# Patient Record
Sex: MALE | Race: WHITE | NOT HISPANIC OR LATINO | Employment: OTHER | URBAN - METROPOLITAN AREA
[De-identification: names, ages, dates, MRNs, and addresses within clinical notes are randomized per-mention and may not be internally consistent; named-entity substitution may affect disease eponyms.]

---

## 2023-12-13 ENCOUNTER — HOSPITAL ENCOUNTER (OUTPATIENT)
Facility: CLINIC | Age: 68
Setting detail: OBSERVATION
Discharge: HOME | End: 2023-12-15
Attending: EMERGENCY MEDICINE | Admitting: PEDIATRICS
Payer: COMMERCIAL

## 2023-12-13 DIAGNOSIS — N17.9 ACUTE KIDNEY INJURY (H): ICD-10-CM

## 2023-12-13 DIAGNOSIS — K21.9 GASTROESOPHAGEAL REFLUX DISEASE WITHOUT ESOPHAGITIS: Primary | ICD-10-CM

## 2023-12-13 DIAGNOSIS — J96.01 ACUTE RESPIRATORY FAILURE WITH HYPOXIA (H): ICD-10-CM

## 2023-12-13 DIAGNOSIS — D50.9 IRON DEFICIENCY ANEMIA, UNSPECIFIED IRON DEFICIENCY ANEMIA TYPE: ICD-10-CM

## 2023-12-13 DIAGNOSIS — D64.9 ANEMIA, UNSPECIFIED TYPE: ICD-10-CM

## 2023-12-13 DIAGNOSIS — R55 SYNCOPE, UNSPECIFIED SYNCOPE TYPE: ICD-10-CM

## 2023-12-13 PROCEDURE — 99285 EMERGENCY DEPT VISIT HI MDM: CPT | Mod: 25 | Performed by: EMERGENCY MEDICINE

## 2023-12-13 PROCEDURE — 93010 ELECTROCARDIOGRAM REPORT: CPT | Performed by: EMERGENCY MEDICINE

## 2023-12-13 PROCEDURE — 96360 HYDRATION IV INFUSION INIT: CPT

## 2023-12-13 PROCEDURE — 93005 ELECTROCARDIOGRAM TRACING: CPT

## 2023-12-13 PROCEDURE — 99291 CRITICAL CARE FIRST HOUR: CPT | Mod: 25

## 2023-12-13 PROCEDURE — 96361 HYDRATE IV INFUSION ADD-ON: CPT

## 2023-12-14 ENCOUNTER — APPOINTMENT (OUTPATIENT)
Dept: GENERAL RADIOLOGY | Facility: CLINIC | Age: 68
End: 2023-12-14
Attending: EMERGENCY MEDICINE
Payer: COMMERCIAL

## 2023-12-14 ENCOUNTER — APPOINTMENT (OUTPATIENT)
Dept: ULTRASOUND IMAGING | Facility: CLINIC | Age: 68
End: 2023-12-14
Attending: HOSPITALIST
Payer: COMMERCIAL

## 2023-12-14 ENCOUNTER — APPOINTMENT (OUTPATIENT)
Dept: CARDIOLOGY | Facility: CLINIC | Age: 68
End: 2023-12-14
Attending: HOSPITALIST
Payer: COMMERCIAL

## 2023-12-14 ENCOUNTER — APPOINTMENT (OUTPATIENT)
Dept: CT IMAGING | Facility: CLINIC | Age: 68
End: 2023-12-14
Attending: EMERGENCY MEDICINE
Payer: COMMERCIAL

## 2023-12-14 PROBLEM — N17.9 ACUTE KIDNEY INJURY (H): Status: ACTIVE | Noted: 2023-12-14

## 2023-12-14 PROBLEM — R09.02 HYPOXIA: Status: ACTIVE | Noted: 2023-12-14

## 2023-12-14 PROBLEM — I10 HTN (HYPERTENSION): Status: ACTIVE | Noted: 2023-12-14

## 2023-12-14 PROBLEM — D50.9 IRON DEFICIENCY ANEMIA: Status: ACTIVE | Noted: 2023-12-14

## 2023-12-14 PROBLEM — J44.9 COPD (CHRONIC OBSTRUCTIVE PULMONARY DISEASE) (H): Status: ACTIVE | Noted: 2023-12-14

## 2023-12-14 PROBLEM — R63.8 INADEQUATE ORAL INTAKE: Status: ACTIVE | Noted: 2023-12-14

## 2023-12-14 PROBLEM — R55 SYNCOPE, UNSPECIFIED SYNCOPE TYPE: Status: ACTIVE | Noted: 2023-12-14

## 2023-12-14 PROBLEM — D64.9 ANEMIA, UNSPECIFIED TYPE: Status: ACTIVE | Noted: 2023-12-14

## 2023-12-14 PROBLEM — I95.9 HYPOTENSION, UNSPECIFIED HYPOTENSION TYPE: Status: ACTIVE | Noted: 2023-12-14

## 2023-12-14 PROBLEM — K21.9 GASTROESOPHAGEAL REFLUX DISEASE WITHOUT ESOPHAGITIS: Status: ACTIVE | Noted: 2023-12-14

## 2023-12-14 PROBLEM — J96.01 ACUTE RESPIRATORY FAILURE WITH HYPOXIA (H): Status: ACTIVE | Noted: 2023-12-14

## 2023-12-14 LAB
ALBUMIN SERPL BCG-MCNC: 3.3 G/DL (ref 3.5–5.2)
ALBUMIN UR-MCNC: 30 MG/DL
ALP SERPL-CCNC: 63 U/L (ref 40–150)
ALT SERPL W P-5'-P-CCNC: 10 U/L (ref 0–70)
AMPHETAMINES UR QL SCN: ABNORMAL
ANION GAP SERPL CALCULATED.3IONS-SCNC: 10 MMOL/L (ref 7–15)
ANION GAP SERPL CALCULATED.3IONS-SCNC: 8 MMOL/L (ref 7–15)
APPEARANCE UR: ABNORMAL
AST SERPL W P-5'-P-CCNC: 13 U/L (ref 0–45)
BARBITURATES UR QL SCN: ABNORMAL
BASE EXCESS BLDV CALC-SCNC: -0.6 MMOL/L (ref -7.7–1.9)
BASE EXCESS BLDV CALC-SCNC: -1.9 MMOL/L (ref -7.7–1.9)
BASOPHILS # BLD AUTO: 0.1 10E3/UL (ref 0–0.2)
BASOPHILS NFR BLD AUTO: 1 %
BENZODIAZ UR QL SCN: ABNORMAL
BILIRUB SERPL-MCNC: 0.2 MG/DL
BILIRUB UR QL STRIP: NEGATIVE
BUN SERPL-MCNC: 28.5 MG/DL (ref 8–23)
BUN SERPL-MCNC: 33.4 MG/DL (ref 8–23)
BZE UR QL SCN: ABNORMAL
CALCIUM SERPL-MCNC: 7.7 MG/DL (ref 8.8–10.2)
CALCIUM SERPL-MCNC: 8 MG/DL (ref 8.8–10.2)
CANNABINOIDS UR QL SCN: ABNORMAL
CHLORIDE SERPL-SCNC: 107 MMOL/L (ref 98–107)
CHLORIDE SERPL-SCNC: 111 MMOL/L (ref 98–107)
CHOLEST SERPL-MCNC: 178 MG/DL
COLOR UR AUTO: YELLOW
CREAT SERPL-MCNC: 1.58 MG/DL (ref 0.67–1.17)
CREAT SERPL-MCNC: 2.49 MG/DL (ref 0.67–1.17)
DEPRECATED HCO3 PLAS-SCNC: 23 MMOL/L (ref 22–29)
DEPRECATED HCO3 PLAS-SCNC: 23 MMOL/L (ref 22–29)
EGFRCR SERPLBLD CKD-EPI 2021: 28 ML/MIN/1.73M2
EGFRCR SERPLBLD CKD-EPI 2021: 48 ML/MIN/1.73M2
EOSINOPHIL # BLD AUTO: 0.2 10E3/UL (ref 0–0.7)
EOSINOPHIL NFR BLD AUTO: 2 %
ERYTHROCYTE [DISTWIDTH] IN BLOOD BY AUTOMATED COUNT: 15.6 % (ref 10–15)
ETHANOL SERPL-MCNC: <0.01 G/DL
FENTANYL UR QL: ABNORMAL
GLUCOSE BLDC GLUCOMTR-MCNC: 104 MG/DL (ref 70–99)
GLUCOSE BLDC GLUCOMTR-MCNC: 105 MG/DL (ref 70–99)
GLUCOSE BLDC GLUCOMTR-MCNC: 98 MG/DL (ref 70–99)
GLUCOSE SERPL-MCNC: 100 MG/DL (ref 70–99)
GLUCOSE SERPL-MCNC: 112 MG/DL (ref 70–99)
GLUCOSE UR STRIP-MCNC: NEGATIVE MG/DL
HBA1C MFR BLD: 5.7 %
HCO3 BLDV-SCNC: 25 MMOL/L (ref 21–28)
HCO3 BLDV-SCNC: 25 MMOL/L (ref 21–28)
HCT VFR BLD AUTO: 27.3 % (ref 40–53)
HDLC SERPL-MCNC: 26 MG/DL
HGB BLD-MCNC: 8.4 G/DL (ref 13.3–17.7)
HGB BLD-MCNC: 8.7 G/DL (ref 13.3–17.7)
HGB UR QL STRIP: NEGATIVE
HOLD SPECIMEN: NORMAL
HYALINE CASTS: 78 /LPF
IMM GRANULOCYTES # BLD: 0.1 10E3/UL
IMM GRANULOCYTES NFR BLD: 1 %
KETONES UR STRIP-MCNC: NEGATIVE MG/DL
LACTATE SERPL-SCNC: 1.5 MMOL/L (ref 0.7–2)
LDLC SERPL CALC-MCNC: 129 MG/DL
LEUKOCYTE ESTERASE UR QL STRIP: NEGATIVE
LVEF ECHO: NORMAL
LYMPHOCYTES # BLD AUTO: 1.8 10E3/UL (ref 0.8–5.3)
LYMPHOCYTES NFR BLD AUTO: 22 %
MAGNESIUM SERPL-MCNC: 2.1 MG/DL (ref 1.7–2.3)
MCH RBC QN AUTO: 27.5 PG (ref 26.5–33)
MCHC RBC AUTO-ENTMCNC: 31.9 G/DL (ref 31.5–36.5)
MCV RBC AUTO: 86 FL (ref 78–100)
MONOCYTES # BLD AUTO: 0.9 10E3/UL (ref 0–1.3)
MONOCYTES NFR BLD AUTO: 11 %
MUCOUS THREADS #/AREA URNS LPF: PRESENT /LPF
NEUTROPHILS # BLD AUTO: 5.2 10E3/UL (ref 1.6–8.3)
NEUTROPHILS NFR BLD AUTO: 63 %
NITRATE UR QL: NEGATIVE
NONHDLC SERPL-MCNC: 152 MG/DL
NRBC # BLD AUTO: 0 10E3/UL
NRBC BLD AUTO-RTO: 0 /100
O2/TOTAL GAS SETTING VFR VENT: 21 %
O2/TOTAL GAS SETTING VFR VENT: 40 %
OPIATES UR QL SCN: ABNORMAL
PCO2 BLDV: 43 MM HG (ref 40–50)
PCO2 BLDV: 52 MM HG (ref 40–50)
PCP QUAL URINE (ROCHE): ABNORMAL
PH BLDV: 7.29 [PH] (ref 7.32–7.43)
PH BLDV: 7.37 [PH] (ref 7.32–7.43)
PH UR STRIP: 5 [PH] (ref 5–7)
PLATELET # BLD AUTO: 227 10E3/UL (ref 150–450)
PO2 BLDV: 32 MM HG (ref 25–47)
PO2 BLDV: 67 MM HG (ref 25–47)
POTASSIUM SERPL-SCNC: 3.5 MMOL/L (ref 3.4–5.3)
POTASSIUM SERPL-SCNC: 4 MMOL/L (ref 3.4–5.3)
PROT SERPL-MCNC: 5.9 G/DL (ref 6.4–8.3)
RBC # BLD AUTO: 3.16 10E6/UL (ref 4.4–5.9)
RBC URINE: 0 /HPF
SODIUM SERPL-SCNC: 140 MMOL/L (ref 135–145)
SODIUM SERPL-SCNC: 142 MMOL/L (ref 135–145)
SP GR UR STRIP: 1.02 (ref 1–1.03)
SQUAMOUS EPITHELIAL: 1 /HPF
TRIGL SERPL-MCNC: 113 MG/DL
TROPONIN T SERPL HS-MCNC: 14 NG/L
TROPONIN T SERPL HS-MCNC: 18 NG/L
TROPONIN T SERPL HS-MCNC: 21 NG/L
UROBILINOGEN UR STRIP-MCNC: NORMAL MG/DL
WBC # BLD AUTO: 8.2 10E3/UL (ref 4–11)
WBC URINE: 1 /HPF

## 2023-12-14 PROCEDURE — G0378 HOSPITAL OBSERVATION PER HR: HCPCS

## 2023-12-14 PROCEDURE — 93306 TTE W/DOPPLER COMPLETE: CPT

## 2023-12-14 PROCEDURE — 96361 HYDRATE IV INFUSION ADD-ON: CPT

## 2023-12-14 PROCEDURE — 93880 EXTRACRANIAL BILAT STUDY: CPT

## 2023-12-14 PROCEDURE — 96372 THER/PROPH/DIAG INJ SC/IM: CPT | Performed by: HOSPITALIST

## 2023-12-14 PROCEDURE — 84484 ASSAY OF TROPONIN QUANT: CPT | Performed by: HOSPITALIST

## 2023-12-14 PROCEDURE — 80307 DRUG TEST PRSMV CHEM ANLYZR: CPT | Performed by: EMERGENCY MEDICINE

## 2023-12-14 PROCEDURE — 36415 COLL VENOUS BLD VENIPUNCTURE: CPT | Performed by: EMERGENCY MEDICINE

## 2023-12-14 PROCEDURE — 76770 US EXAM ABDO BACK WALL COMP: CPT

## 2023-12-14 PROCEDURE — 250N000013 HC RX MED GY IP 250 OP 250 PS 637: Performed by: PEDIATRICS

## 2023-12-14 PROCEDURE — 250N000011 HC RX IP 250 OP 636: Mod: JZ | Performed by: HOSPITALIST

## 2023-12-14 PROCEDURE — 93306 TTE W/DOPPLER COMPLETE: CPT | Mod: 26 | Performed by: INTERNAL MEDICINE

## 2023-12-14 PROCEDURE — 36415 COLL VENOUS BLD VENIPUNCTURE: CPT | Performed by: PEDIATRICS

## 2023-12-14 PROCEDURE — 96360 HYDRATION IV INFUSION INIT: CPT | Mod: 59

## 2023-12-14 PROCEDURE — 250N000013 HC RX MED GY IP 250 OP 250 PS 637: Performed by: HOSPITALIST

## 2023-12-14 PROCEDURE — 82803 BLOOD GASES ANY COMBINATION: CPT | Performed by: EMERGENCY MEDICINE

## 2023-12-14 PROCEDURE — 80048 BASIC METABOLIC PNL TOTAL CA: CPT | Performed by: PEDIATRICS

## 2023-12-14 PROCEDURE — 84484 ASSAY OF TROPONIN QUANT: CPT | Performed by: EMERGENCY MEDICINE

## 2023-12-14 PROCEDURE — 80061 LIPID PANEL: CPT | Performed by: HOSPITALIST

## 2023-12-14 PROCEDURE — 81001 URINALYSIS AUTO W/SCOPE: CPT | Mod: XU | Performed by: HOSPITALIST

## 2023-12-14 PROCEDURE — 82962 GLUCOSE BLOOD TEST: CPT

## 2023-12-14 PROCEDURE — 82803 BLOOD GASES ANY COMBINATION: CPT | Mod: 91 | Performed by: PEDIATRICS

## 2023-12-14 PROCEDURE — 83735 ASSAY OF MAGNESIUM: CPT | Performed by: HOSPITALIST

## 2023-12-14 PROCEDURE — 36415 COLL VENOUS BLD VENIPUNCTURE: CPT | Performed by: HOSPITALIST

## 2023-12-14 PROCEDURE — 80053 COMPREHEN METABOLIC PANEL: CPT | Performed by: EMERGENCY MEDICINE

## 2023-12-14 PROCEDURE — 83036 HEMOGLOBIN GLYCOSYLATED A1C: CPT | Performed by: HOSPITALIST

## 2023-12-14 PROCEDURE — 999N000147 HC STATISTIC PT IP EVAL DEFER: Performed by: PHYSICAL THERAPIST

## 2023-12-14 PROCEDURE — 85025 COMPLETE CBC W/AUTO DIFF WBC: CPT | Performed by: EMERGENCY MEDICINE

## 2023-12-14 PROCEDURE — 71045 X-RAY EXAM CHEST 1 VIEW: CPT

## 2023-12-14 PROCEDURE — 70450 CT HEAD/BRAIN W/O DYE: CPT

## 2023-12-14 PROCEDURE — 258N000003 HC RX IP 258 OP 636: Performed by: EMERGENCY MEDICINE

## 2023-12-14 PROCEDURE — 83605 ASSAY OF LACTIC ACID: CPT | Performed by: EMERGENCY MEDICINE

## 2023-12-14 PROCEDURE — 82077 ASSAY SPEC XCP UR&BREATH IA: CPT | Performed by: EMERGENCY MEDICINE

## 2023-12-14 PROCEDURE — 99207 PR NO BILLABLE SERVICE THIS VISIT: CPT

## 2023-12-14 PROCEDURE — 85018 HEMOGLOBIN: CPT | Mod: 91 | Performed by: PEDIATRICS

## 2023-12-14 RX ORDER — HYDRALAZINE HYDROCHLORIDE 10 MG/1
10 TABLET, FILM COATED ORAL EVERY 4 HOURS PRN
Status: DISCONTINUED | OUTPATIENT
Start: 2023-12-14 | End: 2023-12-14

## 2023-12-14 RX ORDER — GABAPENTIN 300 MG/1
600 CAPSULE ORAL 2 TIMES DAILY
COMMUNITY

## 2023-12-14 RX ORDER — ENOXAPARIN SODIUM 100 MG/ML
40 INJECTION SUBCUTANEOUS EVERY 24 HOURS
Status: DISCONTINUED | OUTPATIENT
Start: 2023-12-14 | End: 2023-12-15 | Stop reason: HOSPADM

## 2023-12-14 RX ORDER — AMOXICILLIN 250 MG
1 CAPSULE ORAL 2 TIMES DAILY PRN
Status: DISCONTINUED | OUTPATIENT
Start: 2023-12-14 | End: 2023-12-15 | Stop reason: HOSPADM

## 2023-12-14 RX ORDER — ONDANSETRON 2 MG/ML
4 INJECTION INTRAMUSCULAR; INTRAVENOUS EVERY 6 HOURS PRN
Status: DISCONTINUED | OUTPATIENT
Start: 2023-12-14 | End: 2023-12-14

## 2023-12-14 RX ORDER — AMOXICILLIN 250 MG
2 CAPSULE ORAL 2 TIMES DAILY PRN
Status: DISCONTINUED | OUTPATIENT
Start: 2023-12-14 | End: 2023-12-15 | Stop reason: HOSPADM

## 2023-12-14 RX ORDER — ONDANSETRON 4 MG/1
4 TABLET, ORALLY DISINTEGRATING ORAL EVERY 6 HOURS PRN
Status: DISCONTINUED | OUTPATIENT
Start: 2023-12-14 | End: 2023-12-14

## 2023-12-14 RX ORDER — LIDOCAINE 40 MG/G
CREAM TOPICAL
Status: DISCONTINUED | OUTPATIENT
Start: 2023-12-14 | End: 2023-12-15 | Stop reason: HOSPADM

## 2023-12-14 RX ORDER — LIDOCAINE 40 MG/G
CREAM TOPICAL
Status: DISCONTINUED | OUTPATIENT
Start: 2023-12-14 | End: 2023-12-14

## 2023-12-14 RX ORDER — ONDANSETRON 2 MG/ML
4 INJECTION INTRAMUSCULAR; INTRAVENOUS EVERY 6 HOURS PRN
Status: DISCONTINUED | OUTPATIENT
Start: 2023-12-14 | End: 2023-12-15 | Stop reason: HOSPADM

## 2023-12-14 RX ORDER — FERROUS SULFATE 325(65) MG
325 TABLET ORAL 2 TIMES DAILY WITH MEALS
Status: DISCONTINUED | OUTPATIENT
Start: 2023-12-14 | End: 2023-12-15 | Stop reason: HOSPADM

## 2023-12-14 RX ORDER — ACETAMINOPHEN 325 MG/1
650 TABLET ORAL EVERY 4 HOURS PRN
Status: DISCONTINUED | OUTPATIENT
Start: 2023-12-14 | End: 2023-12-15 | Stop reason: HOSPADM

## 2023-12-14 RX ORDER — HYDRALAZINE HYDROCHLORIDE 20 MG/ML
10 INJECTION INTRAMUSCULAR; INTRAVENOUS EVERY 4 HOURS PRN
Status: DISCONTINUED | OUTPATIENT
Start: 2023-12-14 | End: 2023-12-14

## 2023-12-14 RX ORDER — CALCIUM CARBONATE 500 MG/1
1000 TABLET, CHEWABLE ORAL 4 TIMES DAILY PRN
Status: DISCONTINUED | OUTPATIENT
Start: 2023-12-14 | End: 2023-12-15 | Stop reason: HOSPADM

## 2023-12-14 RX ORDER — ONDANSETRON 4 MG/1
4 TABLET, ORALLY DISINTEGRATING ORAL EVERY 6 HOURS PRN
Status: DISCONTINUED | OUTPATIENT
Start: 2023-12-14 | End: 2023-12-15 | Stop reason: HOSPADM

## 2023-12-14 RX ORDER — LISINOPRIL 20 MG/1
20 TABLET ORAL DAILY
COMMUNITY

## 2023-12-14 RX ORDER — FAMOTIDINE 10 MG
10 TABLET ORAL 2 TIMES DAILY
Status: DISCONTINUED | OUTPATIENT
Start: 2023-12-14 | End: 2023-12-15 | Stop reason: HOSPADM

## 2023-12-14 RX ORDER — CALCIUM CARBONATE 500 MG/1
1000 TABLET, CHEWABLE ORAL 4 TIMES DAILY PRN
Status: DISCONTINUED | OUTPATIENT
Start: 2023-12-14 | End: 2023-12-14

## 2023-12-14 RX ADMIN — FAMOTIDINE 10 MG: 10 TABLET ORAL at 20:58

## 2023-12-14 RX ADMIN — ENOXAPARIN SODIUM 40 MG: 40 INJECTION SUBCUTANEOUS at 11:18

## 2023-12-14 RX ADMIN — FAMOTIDINE 10 MG: 10 TABLET ORAL at 09:36

## 2023-12-14 RX ADMIN — SODIUM CHLORIDE 1000 ML: 9 INJECTION, SOLUTION INTRAVENOUS at 00:12

## 2023-12-14 RX ADMIN — FERROUS SULFATE TAB 325 MG (65 MG ELEMENTAL FE) 325 MG: 325 (65 FE) TAB at 17:14

## 2023-12-14 ASSESSMENT — ACTIVITIES OF DAILY LIVING (ADL)
ADLS_ACUITY_SCORE: 20
ADLS_ACUITY_SCORE: 22
ADLS_ACUITY_SCORE: 35
ADLS_ACUITY_SCORE: 22
ADLS_ACUITY_SCORE: 35
ADLS_ACUITY_SCORE: 22
ADLS_ACUITY_SCORE: 22
ADLS_ACUITY_SCORE: 35

## 2023-12-14 NOTE — PROGRESS NOTES
S-(situation): Pt arrived to room 256 via cart from ED at 0640.    B-(background): Pt seen in ED for syncopal episode. Had required significant O2 with EMS and in ED. Hx of COPD and CKD. Pt reported having consumed Delta 9 gummies and smoking a bowl of marijuana prior to syncopal event.    A-(assessment): Brief assessment and vitals completed. Pt profile completed. Pt VSS on RA. Pt met with Dr. Melendez via telehealth.    R-(recommendations): Continue admission and review orders for plan of care.

## 2023-12-14 NOTE — UTILIZATION REVIEW
"Admission Status; Secondary Review Determination     Admission Date: 12/13/2023 11:53 PM       Under the authority of the Utilization Management Committee, the utilization review process indicated a secondary review on the above patient.  The review outcome is based on review of the medical records, discussions with staff, and applying clinical experience noted on the date of the review.          (x) Observation Status Appropriate - This patient does not meet hospital inpatient criteria and is placed in observation status. If this patient's primary payer is Medicare and was admitted as an inpatient, Condition Code 44 should be used and patient status changed to \"observation\".       RATIONALE FOR DETERMINATION      Brief clinical presentation, information copied from the chart, abbreviated and edited for relevant content:     Patient is a 68 years old male with a past medical history of chronic pain, marijuana use, chronic kidney disease, hypertension, carpal tunnel and other medical issues was brought to the emergency room status post syncopal event.  Patient is visiting his daughter from [facility] and does not smoke marijuana regularly.  His oral intake has been fluctuating for the past few days. He apparently took a marijuana gummy in addition to smoking marijuana.  He had his dinner and was doing his regular activity.  When he stood up, patient had a syncopal event.  No prodromal symptoms.  No bowel movement or bladder incontinence during or after the episode.  Denies any chest pain or shortness of breath now.  When EMS arrived, he was saturating 73% on room air following which she was placed on a 15 L nonrebreather.  Apparently  blood pressure at the scene was systolic in the 70s.  Patient was initiated on IV fluids and brought to the emergency room.  Workup in the ED revealed a creatinine of 2.49 with a GFR of 29.  EKG shows right bundle branch block but otherwise no acute changes. Hemoglobin was 8.7.  His " blood pressure was in the 90s in the emergency room following which he received IV fluid bolus and subsequently saline infusion.  Patient was admitted for further evaluation.  CT brain show no acute process. Unlikely CNS cause. IV fluids for now and monitor in telemetry.  Check an echocardiogram and check for any cardiac cause.acute kidney injury on chronic kidney disease. Also,  Likely volume depletion/prerenal in the setting of NSAID use. IVF and monitoring. Currently hemodynamically stable, Cr coming down with IVF.      Paged team to change to OBS. Not meeting IP per MCG.       The severity of illness, intensity of cares provided, risk for adverse outcome, and expected LOS make the care appropriate for observation.       The information on this document is developed by the utilization review team in order for the business office to ensure compliance.  This only denotes the appropriateness of proper admission status and does not reflect the quality of care rendered.         The definitions of Inpatient Status and Observation Status used in making the determination above are those provided in the CMS Coverage Manual, Chapter 1 and Chapter 6, section 70.4.      Sincerely,     Tana Meredith MD   Utilization Review/ Case Management  [facility].

## 2023-12-14 NOTE — PROGRESS NOTES
S-(situation): shift note     B-(background): syncope    A-(assessment): pt A/Ox4, VSS. O2 sats 94% RA. Up to BR with A1/cane, unsteady at times, need reminders to slow pace. Denies pain or shortness of breath with activity.    R-(recommendations): will continue with POC.

## 2023-12-14 NOTE — PROGRESS NOTES
S-(situation): Patient registered to Observation from inpatient status    B-(background): Acute Respiratory failure with hypoxia    A-(assessment): Patient is alert, oriented. SBA with cane for activity. Needs reminders to slow down when moving as he appears unsteady on feet. No swallowing concerns present. Consumed 75% of meals. Room air.     R-(recommendations): Orders and observation goals reviewed with patient and family that is now present in the room.     Nursing Observation criteria listed below was met:      OBS video/handout Reviewed & Documented: Yes-gave handout.

## 2023-12-14 NOTE — CONSULTS
[facility]    Brief Stroke Note    Tanner Hilliard is a 68 year old male with a significant PMH of HTN, anemia, COPD, CKD, COPD, anxiety, and cannabis use. History obtained from chart review. Who presented on 12/13 after a syncopal episode. Earlier that day he had smoked marijuana and consumed a delta 8 edible gummy. Upon standing he had a syncopal episode. When EMS arrived, he was saturating to 73% on room air for which he was placed on 15L of oxygen, and systolic BP was in the 70s. IV fluids were initiated and Tanner is currently undergoing further evaluation of his presenting symptoms. Discussed patient with overnight (Dr. Al Ardon) and day Hospitalist (Dr. Landrum) and per them there is no current concerns for acute stroke, so formal visit will be deferred.     Vitals  BP: 130/84   Pulse: 75   Resp: 16   Temp: 97.5  F (36.4  C)   Weight: 77.9 kg (171 lb 12.8 oz)    Imaging Findings  CT head:   No definite acute intracranial pathology.   Mild volume loss and presumed chronic small vessel ischemic changes.  Chronic appearing lacunar infarct in the right caudate nucleus.    Recommendations  - Case discussed with vascular neurology attending Dr. Fernandez. Reviewed CT Head revealed chronic lacunar infarct of the right caudate nucleus. Recommend outpatient follow up up PCP or General Neurology regarding optimizing secondary stroke prevention.   - If any new or worsening neurologic symptoms occur please call a stroke code.     No further stroke workup is recommended, we will sign off. Please contact us for additional questions or concerns.     My recommendations are based on the information provided over the phone by Tanner Hilliard's in-person providers. They are not intended to replace the clinical judgment of his in-person providers. I was not requested to personally see or examine the patient at this time.     Shayy Gomez PA-C  Vascular Neurology    To page me or  "covering stroke neurology team member, click here: [facility]COM  Choose \"On Call\" tab at top, then select \"NEUROLOGY/ALL SITES\" from middle drop-down box, press Enter, then look for \"stroke\" or \"telestroke\" for your site.     "

## 2023-12-14 NOTE — H&P
"Admit Date: 12/13/2023       Chief Complaints:  Altered mental status    HPI:    68 years old male with a past medical history of chronic pain, marijuana use, chronic kidney disease, hypertension, carpal tunnel and other medical issues was brought to the emergency room status post syncopal event.  Patient is visiting his daughter from [facility] and does not smoke marijuana regularly.  His oral intake has been fluctuating for the past few days.  He apparently took a marijuana gummy in addition to smoking marijuana.  He had his dinner and was doing his regular activity.  When he stood up, patient had a syncopal event.  No prodromal symptoms.  No bowel movement or bladder incontinence during or after the episode.  Denies any chest pain or shortness of breath now.  When EMS arrived, he was saturating 73% on room air following which she was placed on a 15 L nonrebreather.  Apparently her blood pressure at the scene was systolic in the 70s.  Patient was initiated on IV fluids and brought to the emergency room.  Currently patient denies any chest pain or dizziness.  Workup in the ED revealed a creatinine of 2.49 with a GFR of 29.  EKG shows right bundle branch block but otherwise no acute changes.  Hemoglobin was 8.7.  His blood pressure was in the 90s in the emergency room following which he received IV fluid bolus and subsequently saline infusion.  Patient was admitted for further evaluation    Review of symptoms:  12 point review of system is negative unless otherwise stated in history of present illness    Clinically Significant Risk Factors Present on Admission          # Hypocalcemia: Lowest Ca = 7.7 mg/dL in last 2 days, will monitor and replace as appropriate     # Hypoalbuminemia: Lowest albumin = 3.3 g/dL at 12/14/2023 12:10 AM, will monitor as appropriate          # Overweight: Estimated body mass index is 25.37 kg/m  as calculated from the following:    Height as of this encounter: 1.753 m (5' 9\").    Weight " as of this encounter: 77.9 kg (171 lb 12.8 oz).                 Past Medical History:   Diagnosis Date    MEDICAL HISTORY OF - 1988 or 1989    Ruptured artery in his long with some hemoptysis.       Principal Problem:    Acute respiratory failure with hypoxia (H)  Active Problems:    Acute kidney injury (H24)    Syncope, unspecified syncope type    Anemia, unspecified type        Current Facility-Administered Medications:     acetaminophen (TYLENOL) tablet 650 mg, 650 mg, Oral, Q4H PRN, Morgan Hernandez MD    calcium carbonate (TUMS) chewable tablet 1,000 mg, 1,000 mg, Oral, 4x Daily PRN, Morgan Hernandez MD    enoxaparin ANTICOAGULANT (LOVENOX) injection 40 mg, 40 mg, Subcutaneous, Q24H, Morgan Hernandez MD    [DISCONTINUED] hydrALAZINE (APRESOLINE) tablet 10 mg, 10 mg, Oral, Q4H PRN **OR** hydrALAZINE (APRESOLINE) injection 10 mg, 10 mg, Intravenous, Q4H PRN, Morgan Hernandez MD    lidocaine (LMX4) kit, , Topical, Q1H PRN, Morgan Hernandez MD    lidocaine (LMX4) kit, , Topical, Q1H PRN, Morgan Hernandez MD    lidocaine 1 % 0.1-1 mL, 0.1-1 mL, Other, Q1H PRN, Morgan Hernandez MD    lidocaine 1 % 0.1-1 mL, 0.1-1 mL, Other, Q1H PRN, Morgan Hernandez MD    medication instruction - No oral meds if patient didn't pass dysphagia screen, , Does not apply, Continuous PRN, Morgan Hernandez MD    Medication Instructions - Avoid dextrose in IV solutions., , Intravenous, Continuous PRN, Morgan Hernandez MD    ondansetron (ZOFRAN ODT) ODT tab 4 mg, 4 mg, Oral, Q6H PRN **OR** ondansetron (ZOFRAN) injection 4 mg, 4 mg, Intravenous, Q6H PRN, Morgan Hernandez MD    senna-docusate (SENOKOT-S/PERICOLACE) 8.6-50 MG per tablet 1 tablet, 1 tablet, Oral, BID PRN **OR** senna-docusate (SENOKOT-S/PERICOLACE) 8.6-50 MG per  "tablet 2 tablet, 2 tablet, Oral, BID PRN, Morgan Hernandez MD    sodium chloride (PF) 0.9% PF flush 3 mL, 3 mL, Intracatheter, Q8H, Morgan Hernandez MD    sodium chloride (PF) 0.9% PF flush 3 mL, 3 mL, Intracatheter, q1 min prn, Morgan Hernandez MD    sodium chloride (PF) 0.9% PF flush 3 mL, 3 mL, Intracatheter, Q8H, Morgan Hernandez MD    sodium chloride (PF) 0.9% PF flush 3 mL, 3 mL, Intracatheter, q1 min prn, Morgan Hernandez MD    Past Surgical History:   Procedure Laterality Date    APPENDECTOMY      VASECTOMY         Allergies   Allergen Reactions    Cipro [Ciprofloxacin]        Family History   Problem Relation Age of Onset    Heart Disease Father     Heart Disease Brother        Social History     Socioeconomic History    Marital status:    Tobacco Use    Smoking status: Every Day     Packs/day: 1     Types: Cigarettes    Tobacco comments:     pt quit for 20 yrs started back in    Substance and Sexual Activity    Alcohol use: No    Drug use: Yes     Comment: quit in    Other Topics Concern     Service Yes    Blood Transfusions No    Caffeine Concern No    Sleep Concern Yes     Comment: doesn't sleep well    Stress Concern Yes     Comment: unemployed for over a year    Weight Concern Yes    Exercise No    Seat Belt Yes       Physical Exam:  Vitals:    23 0454 23 0500 23 0600 23 0702   BP:    130/84   BP Location:    Right arm   Pulse:   76 75   Resp: 15   16   Temp:    97.5  F (36.4  C)   TempSrc:    Oral   SpO2: 90% (!) 88% 98% 93%   Weight:    77.9 kg (171 lb 12.8 oz)   Height:    1.753 m (5' 9\")      /84 (BP Location: Right arm)   Pulse 75   Temp 97.5  F (36.4  C) (Oral)   Resp 16   Ht 1.753 m (5' 9\")   Wt 77.9 kg (171 lb 12.8 oz)   SpO2 93%   BMI 25.37 kg/m    Systolic (24hrs), Av , Min:83 , Max:130   Diastolic (24hrs), Av, " Min:57, Max:84    No intake or output data in the 24 hours ending 12/14/23 0747    General:    not in acute distress, not in pain  Head:  atraumatic, normocephalic, face symmetrical  Eye:  conjunctivae clear , anicteric  Ear:  normal external ears, grossly normal hearing  Neck:  supple, no JVD  Respiratory:  no respiratory distress, no labored respirations, no shortness of breath,  Lung Sounds:  bilateral: clear to auscultation  Cardiovascular:normal rate, regular rhythm, PMI normal, S1 - normal, S2 - normal splitting  Neurological Status:  alert, awake, oriented to person, oriented to place, oriented to time  Skin:  normal turgor, warm  PSYCH - good eye contact appears euthymic       I&O: No intake/output data recorded.    Lab Results:   CBC:   Lab Results   Component Value Date    WBC 8.2 12/14/2023    WBC 9.7 08/01/2010    RBC 3.16 12/14/2023    RBC 4.13 08/01/2010     BMP:   Lab Results   Component Value Date    POTASSIUM 3.5 12/14/2023    POTASSIUM 4.4 08/01/2010    CHLORIDE 107 12/14/2023    CHLORIDE 107 08/01/2010    BUN 33.4 12/14/2023    BUN 29 08/01/2010     CMP:  Lab Results   Component Value Date    POTASSIUM 3.5 12/14/2023    POTASSIUM 4.4 08/01/2010    CHLORIDE 107 12/14/2023    CHLORIDE 107 08/01/2010    ANIONGAP 10 12/14/2023    ANIONGAP 9.8 08/01/2010    BUN 33.4 12/14/2023    BUN 29 08/01/2010    ALBUMIN 3.3 12/14/2023    ALBUMIN 4.4 07/09/2009    AST 13 12/14/2023    AST 24 07/09/2009      Thyroid:   Lab Results   Component Value Date    TSH 4.63 02/12/2009               Assessment/Plan:    68 years old male with a past medical history of chronic pain, marijuana use, chronic kidney disease, hypertension, carpal tunnel and other medical issues was brought to the emergency room status post syncopal event.  Patient is visiting his daughter from [facility] and does not smoke marijuana regularly.  His oral intake has been fluctuating for the past few days.  He apparently took a marijuana gummy in  addition to smoking marijuana.  He had his dinner and was doing his regular activity.  When he stood up, patient had a syncopal event.  No prodromal symptoms.  No bowel movement or bladder incontinence during or after the episode.  Denies any chest pain or shortness of breath now.  When EMS arrived, he was saturating 73% on room air following which she was placed on a 15 L nonrebreather.  Apparently her blood pressure at the scene was systolic in the 70s.  Patient was initiated on IV fluids and brought to the emergency room.  Currently patient denies any chest pain or dizziness.  Workup in the ED revealed a creatinine of 2.49 with a GFR of 29.  EKG shows right bundle branch block but otherwise no acute changes.  Hemoglobin was 8.7.  His blood pressure was in the 90s in the emergency room following which he received IV fluid bolus and subsequently saline infusion.  Patient was admitted for further evaluation    1 syncopal event likely suspect due to hypovolemia/vasovagal event in the setting of marijuana use.  CT brain show no acute process. Unlikely CNS cause. IV fluids for now and monitor in telemetry.  Check an echocardiogram and check for any cardiac cause.    2 acute kidney injury on chronic kidney disease.  Likely volume depletion/prerenal in the setting of NSAID use.  Apparently patient took 800 mg ibuprofen yesterday for his back pain.  IV fluids for now.  Check a UA and renal ultrasound for further evaluation.  Avoid NSAIDs and nephrotoxic agents    3.  Chronic pain/back pain on chronic marijuana use.  May have reacted to the gummy.  For now treatment is supportive but avoid NSAIDs    4 DVT prophylaxis will be Lovenox    5 GI prophylaxis will be Protonix    Patient will be admitted under inpatient status.  Given the acute renal failure in the setting of syncopal event/hypotension needing IV fluids and other intervention, patient meets criteria for inpatient with expected length of stay greater than 2  midnights      Our patient will be admitted under the hospitalist services and further management to be based on patient's clinical progress.    As the provider for the telehealth service, I attest that I introduced myself to the patient and provided my credentials. Based on review of the patient s chart and/or a discussion with members of the patient s treatment team, I determined that telemedicine via a real-time, two-way, interactive audio and video platform is an appropriate means of providing this service. The patient and I mutually agreed that this visit is appropriate for telemedicine as well.    The patient was located at [facility]. IMorgan was at [place], [place]. The encounter was approximately 10 minutes. The nurse was present for the duration of the encounter.    Chart reviewed,labs and available imaging reviewed,consultant notes reviewed. Previous available medical records have been reviewed  Care plan discussed with care team    I have seen and examined the patient today. Documentation for this visit was completed using a template. Everything documented was personally performed today with the necessary additions, deletions and changes made as appropriate with the diagnoses being listed in no particular order of clinical relevance.    Pan Zeng MD MPh  Securely message with the Vocera Web Console (learn more here)  Text page via [employer] Paging/Directory

## 2023-12-14 NOTE — ED PROVIDER NOTES
"  History     Chief Complaint   Patient presents with    Altered Mental Status     HPI  Tanner Hilliard is a 68 year old male who presents via EMS from home after witnessed syncopal episode.  History was given by daughters on scene.  They states that Tanner had been at home, and was in his bedroom.  He had just ingested a marijuana gummy, and then \"collapsed\".  More history was obtained from his daughter when she had come to the bedside later.  She states that Tanner is a regular marijuana user, and he \"smoked a bowl\" earlier and then took a gummy.  Apparently, after he had ingested that gummy, he almost immediately had stood up and said that he was not feeling well and then had a syncopal episode.  When EMS arrived on scene, had oxygen saturations of 73% on room air, and was placed on 15 L nonrebreather which brought sats up to 98%.  Blood pressure on scene was 70s systolic, and IV fluids were started.  Running only states that he has low back pain, for which he takes ibuprofen.  His daughter also states that he is visiting from [place], and [facility]    PMH CKD, anemia, COPD, abdominal hernia, anxiety, cannabis abuse, hypertension, carpal tunnel, low back pain    Allergies:  Allergies   Allergen Reactions    Cipro [Ciprofloxacin]        Problem List:    Patient Active Problem List    Diagnosis Date Noted    CARDIOVASCULAR SCREENING; LDL GOAL LESS THAN 130 10/31/2010     Priority: Medium    GERD (gastroesophageal reflux disease) 02/12/2009     Priority: Medium        Past Medical History:    Past Medical History:   Diagnosis Date    MEDICAL HISTORY OF - 1988 or 1989       Past Surgical History:    Past Surgical History:   Procedure Laterality Date    APPENDECTOMY  1996    VASECTOMY  1999       Family History:    Family History   Problem Relation Age of Onset    Heart Disease Father     Heart Disease Brother        Social History:  Marital Status:   [4]  Social " History     Tobacco Use    Smoking status: Every Day     Packs/day: 1     Types: Cigarettes    Tobacco comments:     pt quit for 20 yrs started back in 2004   Substance Use Topics    Alcohol use: No    Drug use: Yes     Comment: quit in 2007        Medications:    IBUPROFEN 800 MG PO TABS          Review of Systems   All other systems reviewed and are negative.      Physical Exam   BP: 91/57  Pulse: 71  Resp: 18  SpO2: 93 %      Physical Exam  Vitals and nursing note reviewed.   Constitutional:       Appearance: He is normal weight. He is not diaphoretic.   HENT:      Head: Normocephalic.   Eyes:      General: No scleral icterus.     Extraocular Movements: Extraocular movements intact.      Pupils: Pupils are equal, round, and reactive to light.   Cardiovascular:      Rate and Rhythm: Normal rate and regular rhythm.   Pulmonary:      Effort: Pulmonary effort is normal.      Breath sounds: Normal breath sounds. No wheezing.   Abdominal:      General: Bowel sounds are normal.      Palpations: Abdomen is soft.   Skin:     General: Skin is warm and dry.      Findings: No rash.   Neurological:      GCS: GCS eye subscore is 4. GCS verbal subscore is 5. GCS motor subscore is 6.         ED Course                 Procedures              EKG Interpretation:      Interpreted by Lisha Garcia DO  Time reviewed: 0025  Symptoms at time of EKG: Syncopal episode, hypotension  Rhythm: normal sinus   Rate: 72 bpm  Axis: Normal  Ectopy: none  Conduction: right bundle branch block (incomplete)  ST Segments/ T Waves: No acute ischemic changes  Q Waves: none  Comparison to prior: No old EKG available    Clinical Impression: non-specific EKG            Critical Care time:  none               Results for orders placed or performed during the hospital encounter of 12/13/23 (from the past 24 hour(s))   CBC with platelets differential    Narrative    The following orders were created for panel order CBC with platelets  differential.  Procedure                               Abnormality         Status                     ---------                               -----------         ------                     CBC with platelets and d...[[id number]]  Abnormal            Final result                 Please view results for these tests on the individual orders.   Comprehensive metabolic panel   Result Value Ref Range    Sodium 140 135 - 145 mmol/L    Potassium 3.5 3.4 - 5.3 mmol/L    Carbon Dioxide (CO2) 23 22 - 29 mmol/L    Anion Gap 10 7 - 15 mmol/L    Urea Nitrogen 33.4 (H) 8.0 - 23.0 mg/dL    Creatinine 2.49 (H) 0.67 - 1.17 mg/dL    GFR Estimate 28 (L) >60 mL/min/1.73m2    Calcium 7.7 (L) 8.8 - 10.2 mg/dL    Chloride 107 98 - 107 mmol/L    Glucose 112 (H) 70 - 99 mg/dL    Alkaline Phosphatase 63 40 - 150 U/L    AST 13 0 - 45 U/L    ALT 10 0 - 70 U/L    Protein Total 5.9 (L) 6.4 - 8.3 g/dL    Albumin 3.3 (L) 3.5 - 5.2 g/dL    Bilirubin Total 0.2 <=1.2 mg/dL   Lactic acid whole blood   Result Value Ref Range    Lactic Acid 1.5 0.7 - 2.0 mmol/L   Troponin T, High Sensitivity   Result Value Ref Range    Troponin T, High Sensitivity 21 <=22 ng/L   Blood gas venous   Result Value Ref Range    pH Venous 7.29 (L) 7.32 - 7.43    pCO2 Venous 52 (H) 40 - 50 mm Hg    pO2 Venous 32 25 - 47 mm Hg    Bicarbonate Venous 25 21 - 28 mmol/L    Base Excess/Deficit -1.9 -7.7 - 1.9 mmol/L    FIO2 40    Ethyl Alcohol Level   Result Value Ref Range    Alcohol ethyl <0.01 <=0.01 g/dL   CBC with platelets and differential   Result Value Ref Range    WBC Count 8.2 4.0 - 11.0 10e3/uL    RBC Count 3.16 (L) 4.40 - 5.90 10e6/uL    Hemoglobin 8.7 (L) 13.3 - 17.7 g/dL    Hematocrit 27.3 (L) 40.0 - 53.0 %    MCV 86 78 - 100 fL    MCH 27.5 26.5 - 33.0 pg    MCHC 31.9 31.5 - 36.5 g/dL    RDW 15.6 (H) 10.0 - 15.0 %    Platelet Count 227 150 - 450 10e3/uL    % Neutrophils 63 %    % Lymphocytes 22 %    % Monocytes 11 %    % Eosinophils 2 %    % Basophils 1 %    % Immature  Granulocytes 1 %    NRBCs per 100 WBC 0 <1 /100    Absolute Neutrophils 5.2 1.6 - 8.3 10e3/uL    Absolute Lymphocytes 1.8 0.8 - 5.3 10e3/uL    Absolute Monocytes 0.9 0.0 - 1.3 10e3/uL    Absolute Eosinophils 0.2 0.0 - 0.7 10e3/uL    Absolute Basophils 0.1 0.0 - 0.2 10e3/uL    Absolute Immature Granulocytes 0.1 <=0.4 10e3/uL    Absolute NRBCs 0.0 10e3/uL   [facility] Draw    Narrative    The following orders were created for panel order [facility] Draw.  Procedure                               Abnormality         Status                     ---------                               -----------         ------                     Extra Blue Top Tube[[id number]]                              Final result               Extra Red Top Tube[[id number]]                               Final result                 Please view results for these tests on the individual orders.   Extra Blue Top Tube   Result Value Ref Range    Hold Specimen     Extra Red Top Tube   Result Value Ref Range    Hold Specimen     XR Chest Port 1 View    Narrative    EXAM: XR CHEST PORT 1 VIEW  LOCATION: [facility]  DATE: 12/14/2023    INDICATION: Syncope, hypoxia  COMPARISON: None.      Impression    IMPRESSION: Heart size and pulmonary vascularity normal. Elevated right hemidiaphragm. Interstitial densities right base could represent subsegmental atelectasis or infiltrate. The left lung is clear. No effusions.   CT Head w/o Contrast    Narrative    EXAM: CT HEAD W/O CONTRAST  LOCATION: [facility]  DATE: 12/14/2023    INDICATION: Syncopal episode.  COMPARISON: None.  TECHNIQUE: Routine CT Head without IV contrast. Multiplanar reformats. Dose reduction techniques were used.    FINDINGS:  INTRACRANIAL CONTENTS: No intracranial hemorrhage, extraaxial collection, or mass effect. No CT evidence of acute infarct. Mild presumed chronic small vessel ischemic changes. Mild generalized volume loss. No  hydrocephalus. Chronic lacunar infarct in the   right caudate nucleus.     VISUALIZED ORBITS/SINUSES/MASTOIDS: No intraorbital abnormality. Air-fluid level in the left maxillary sinus. Small polyp or retention cyst in the right maxillary sinus. No middle ear or mastoid effusion.    BONES/SOFT TISSUES: No acute abnormality.      Impression    IMPRESSION:  1.  No definite acute intracranial pathology.  2.  Mild volume loss and presumed chronic small vessel ischemic changes.  3.  Chronic appearing lacunar infarct in the right caudate nucleus.  4.  Air-fluid level in the left maxillary sinus. Correlate for acute sinusitis.   Troponin T, High Sensitivity   Result Value Ref Range    Troponin T, High Sensitivity 18 <=22 ng/L       Medications   sodium chloride 0.9% BOLUS 1,000 mL (0 mLs Intravenous Stopped 12/14/23 0205)       Assessments & Plan (with Medical Decision Making)  Tanner is a 68-year-old male presenting via EMS over concern of syncopal episode and being unresponsive at home, oxygen saturations in the field of 73% on room air.  See history and physical exam as above  68-year-old male not diaphoretic, arrived on 15 L of oxygen via nonrebreather.  He was responsive to voice, GCS of 15.  He knows his age and date of birth, is not quite sure of hospital or location, but this is understandable given that he is not from the area.  He was maintaining saturations on nasal cannula after being moved from EMS gurney and equipment.  Peripheral IVs were established and will check labs, EKG, and plan to get chest x-ray and possibly head CT.  Also given IV fluid bolus  Labs and imaging as above.  Initial troponin is normal at 21.  EKG showed a right bundle branch block out any acute ST elevations.  White blood cell count was normal and lactic acid level was normal.  However, did notice that the patient was anemic with a hemoglobin of 8.7.  With minimal notes available from [facility], did see the patient had documented  history of chronic anemia.  He also has a history of CKD, but do not know his baseline creatinine.  Today creatinine is 2.49 with a GFR of 29.  Patient did require a second IV fluid bolus and then had normal saline infusion running at 150 mL/h.  He was maintaining a MAP above 65, but blood pressures were on the softer side of normal with systolics in the 90s and low 100s.  A second troponin was checked, and remains within normal limits at 18.  Patient has become more awake and alert, and denies any chest pain or having any symptoms prior to the syncopal episode.  He is still requiring a small amount of supplemental oxygen to maintain saturations, though this may be due to underlying COPD.  Since patient is not local to the area, and have concern over his syncopal episode in the setting of anemia with a hemoglobin of 8.7, chronic kidney disease with unknown baseline possibility of acute kidney injury superimposed on chronic kidney disease, I recommended that he be hospitalized for ongoing observation.  Can continue IV fluids and hopefully wean off of oxygen needs.  If he is able to stabilize, would suspect he would be able to do safely discharged home in good follow-up with his primary provider in [place] once he returns to the area.  Spoke with Dr. Melendez, telemetry hospitalist on-call.  He accepted the patient for admission.  Admitted to floor in stable condition     I have reviewed the nursing notes.    I have reviewed the findings, diagnosis, plan and need for follow up with the patient.       ED to Inpatient Handoff:    Discussed with Dr. Melendez at 0440  Patient accepted for Inpatient Stay  Pending studies include N/A  Code Status: Not Addressed             Medical Decision Making  The patient's presentation was of moderate complexity (an undiagnosed new problem with uncertain diagnosis).    The patient's evaluation involved:  ordering and/or review of 3+ test(s) in this encounter (see separate area of note  for details)    The patient's management necessitated high risk (a decision regarding hospitalization).        New Prescriptions    No medications on file       Final diagnoses:   Syncope, unspecified syncope type   Acute kidney injury (H24)   Anemia, unspecified type   Acute respiratory failure with hypoxia (H)       12/13/2023   [facility] EMERGENCY DEPT       Lisha Garcia DO  12/14/23 0760

## 2023-12-14 NOTE — PROGRESS NOTES
[facility]    Medicine Progress Note - Hospitalist Service    Date of Admission:  12/13/2023    Assessment & Plan   68-year-old man from [place] who drove to [place] on December 11 with chronic hypertension, COPD, chronic kidney disease with baseline creatinine 1.3, iron deficiency, and aortic aneurysm presented to ER last night after witnessed syncopal event at his daughter's home.  Due to concerns for syncope, acute kidney injury, and chronic cannabis use, he was hospitalized.    Principal Problem:    Syncope, unspecified syncope type  Active Problems:    Hypotension, unspecified hypotension type    Acute kidney injury (H24)    Hypoxia    Iron deficiency anemia    HTN (hypertension)    Inadequate oral intake    COPD (chronic obstructive pulmonary disease) (H)    Gastroesophageal reflux disease without esophagitis    Syncope  Hypotension  Suspect syncope due to hypotension of multifactorial causes including chronic use of lisinopril, hypovolemia associated with poor oral intake recently, and chronic anemia.  Transient hypotension at presentation resolved after IV fluid bolus and holding previous chronic dose of lisinopril and he has not had any recurrent syncopal events.  Acute stroke is not suspected clinically or based on radiographic findings.  Acute ischemic heart disease is not suspected clinically or based on test results.  Seizure is not suspected.  -Continue cardiac monitoring  -Not yet restarting chronic dose of lisinopril  -Advance diet, encourage better oral intake  -Advance activity  -Registered to observation status  -Monitor orthostatic blood pressure    Acute kidney injury  Chronic kidney disease  Limited medical records from [place] indicate the patient carries diagnosis of chronic kidney disease of unknown stage with baseline creatinine 1.3 in May 2023.  He presented with creatinine 2.49, significantly increased compared with previous baseline suspicious for  acute kidney injury.  Renal function is nearly recovering to baseline quickly after IV fluid bolus.  Suspect acute kidney injury was precipitated by hypovolemia and hypotension.  -Holding lisinopril for now  -Not continuing IV fluids unless he becomes hypotensive  -Ordered recheck of renal function  -Avoid NSAIDs today    Hypoxia  COPD  Atelectasis  Has intermittently been severely hypoxic treated intermittently and transiently with low-flow oxygen supplementation.  As he has become more interactive, hypoxia has completely resolved.  He also presented with initial mild respiratory acidosis that has also resolved as he has become more responsive.  Prior medical records refer to diagnosis of COPD for which he has previously been prescribed tiotropium and/or albuterol.  Clinical course has not been suspicious for COPD exacerbation.  Elevated right hemidiaphragm with densities at the right lung base were present radiographically most likely due to atelectasis after his syncopal event based on clinical course.  Infiltrate and pneumonia seem unlikely.  Although there was initial concern for possible acute respiratory failure, respiratory failure has been ruled out after additional investigation.  Suspect hypoxia started after syncopal event rather than triggering syncopal event.  -Monitor oxygenation  -May use bronchodilators as needed    Iron deficiency anemia  Moderately anemic at admission with hemoglobin 8-9 that has been stable during hospitalization.  Active bleeding has not been suspected nor has recent bleeding been reported.  In June 2023 test results performed in [place] were suspicious for iron deficiency, so suspect he has chronic iron deficiency anemia.  Chronic anemia likely contributes to risk for hypovolemia and syncope.  -Ordered recheck of hemoglobin  -Recommended that he start iron supplementation with which he was agreeable  -Recommend recheck of hemoglobin and reticulocyte count with his PCP after he  returns to [place]    Poor oral intake  Hypoalbuminemia  He reports very little oral intake on December 11 and December 12.  He was driving all day on December 11 to [place] from [place].  On December 12 he slept all day.  He says he has not had much appetite lately and did not have much oral intake on December 13 either.  Suspect poor oral intake contributed to hypovolemia and syncope.  Serum albumin was low at 3.3.  Suspect hypoalbuminemia is due to poor oral nutritional intake.  -Encouraged adequate oral intake  -Continue chronic PPI  -Monitor albumin as indicated    Abnormal echocardiogram  Echocardiogram does demonstrate abnormal inferior wall motion suspicious for regional wall motion abnormality.  He is not known to have CAD.  He has not had angina.  Troponin was normal.  There were no ischemic changes on EKG.  LVEF was normal.  -Not anticipating additional cardiac evaluation during this hospitalization aside from continued cardiac monitoring for another 24 hours  -Recommend outpatient follow-up with his primary care provider in [place]    Possible old stroke  Hyperlipidemia  Head CT demonstrated findings suspicious for old lacunar infarct raising suspicion for previous stroke although there is no clinical history of stroke.  He does not appear to be taking medication that would normally be recommended for secondary prevention of stroke such as antiplatelet therapy or statin.  Lipid panel was abnormal notable for low HDL and elevated LDL and non-HDL.  -Recommend outpatient follow-up with his primary care provider in [place] to discuss starting regular antiplatelet therapy and/or statin    GERD  Clinically stable.  Chronically taking PPI.  -Continue PPI       Observation Goals: List all  goals to be met before discharge:, - Diagnostic tests and consults completed and resulted, - No further episodes of syncope and any new arrhythmia addressed with controlled heart rates, - Vital signs normal or at patient  "baseline and orthostatic vitals are normal and patient not lightheaded with standing, - Tolerating oral intake to maintain hydration, - Safe disposition plan has been identified, - Nurse to notify provider when observation goals have been met and patient is ready for discharge.  Diet: Combination Diet Low Saturated Fat Na <2400mg Diet, No Caffeine Diet    DVT Prophylaxis: Observation status  Weiner Catheter: Not present  Lines: None     Cardiac Monitoring: ACTIVE order. Indication: Syncope- high cardiac risk (48 hours)  Code Status:  No intubation, perform CPR    Clinically Significant Risk Factors Present on Admission          # Hypocalcemia: Lowest Ca = 7.7 mg/dL in last 2 days, will monitor and replace as appropriate     # Hypoalbuminemia: Lowest albumin = 3.3 g/dL at 12/14/2023 12:10 AM, will monitor as appropriate     # Hypertension: Noted on problem list      # Overweight: Estimated body mass index is 25.37 kg/m  as calculated from the following:    Height as of this encounter: 1.753 m (5' 9\").    Weight as of this encounter: 77.9 kg (171 lb 12.8 oz).              Disposition Plan      Expected Discharge Date: 12/15/2023                    Gopal Landrum MD  Hospitalist Service  [facility]  Securely message with [employer] (more info)  Text page via [facility] Paging/Directory   ______________________________________________________________________    Interval History   He feels better today during the day.  He denies any lightheadedness or dizziness.  He denies any cough or shortness of breath.  He denies any chest pain.  He has not noted any new neurologic deficits.  He is tolerating advancing diet although says he does not have much appetite.  He said he had almost nothing to eat or drink on December 11 when he was driving to [place] from [place] nor on December 12 when he slept all day after that drive.  He had a little bit to eat and drink on December 13 prior to his syncopal " "event.  He has been afebrile.  Since hospitalization, blood pressure has been stable.  He transiently required oxygen supplementation because of hypoxia overnight but since waking today has not needed any oxygen supplementation.  He is voiding spontaneously.    Physical Exam   Vital Signs: Temp: 97.9  F (36.6  C) Temp src: Oral BP: (!) 144/77 Pulse: 89   Resp: 18 SpO2: 97 % O2 Device: None (Room air)   Patient Vitals for the past 24 hrs:   BP Temp Temp src Pulse Resp SpO2 Height Weight   12/14/23 1540 (!) 144/77 97.9  F (36.6  C) Oral 89 18 97 % -- --   12/14/23 1133 133/77 98  F (36.7  C) Oral 82 16 98 % -- --   12/14/23 0702 130/84 97.5  F (36.4  C) Oral 75 16 93 % 1.753 m (5' 9\") 77.9 kg (171 lb 12.8 oz)   12/14/23 0600 -- -- -- 76 -- 98 % -- --   12/14/23 0500 -- -- -- -- -- (!) 88 % -- --   12/14/23 0454 -- -- -- -- 15 90 % -- --   12/14/23 0453 -- -- -- -- 17 90 % -- --   12/14/23 0452 -- -- -- -- 20 90 % -- --   12/14/23 0451 -- -- -- -- 21 91 % -- --   12/14/23 0450 -- -- -- -- 13 96 % -- --   12/14/23 0430 108/74 -- -- 77 16 98 % -- --   12/14/23 0218 99/67 -- -- 78 13 97 % -- --   12/14/23 0144 -- -- -- 82 18 92 % -- --   12/14/23 0143 -- -- -- 81 16 (!) 87 % -- --   12/14/23 0103 105/66 -- -- 75 17 97 % -- --   12/14/23 0100 105/66 -- -- 73 16 97 % -- --   12/14/23 0004 92/58 -- -- 73 18 94 % -- --   12/14/23 0000 (!) 85/64 -- -- 73 21 92 % -- --   12/13/23 2357 (!) 83/57 -- -- 73 (!) 54 91 % -- --   12/13/23 2355 91/57 -- -- 71 18 93 % -- --       Weight: 171 lbs 12.8 oz    General Appearance: Elderly man, no acute distress sitting up in bed  Respiratory: Normal respiratory effort  Cardiovascular: Regular rate and rhythm  Other: Alert and conversive, normal speech, no confusion evident, no obvious focal deficits    Medical Decision Making       49 MINUTES SPENT BY ME on the date of service doing chart review, history, exam, documentation & further activities per the note.      Limited medical records " reviewed from [place] including problem list from [facility] and results of blood test performed in May and June 2023.  According to those records, patient carries diagnoses of hypertension, COPD, chronic kidney disease, and aortic aneurysm.  Creatinine on May 18 was 1.3 with BUN 18 at that time.  In June 2023, serum iron and IBC were normal but ferritin was low at 21 (24 is lower limit of normal).  Iron saturation was low at 14% (20% is lower limit of normal).    Data     I have personally reviewed the following data over the past 24 hrs:    8.2  \   8.4 (L)   / 227     142 111 (H) 28.5 (H) /  98   4.0 23 1.58 (H) \     ALT: 10 AST: 13 AP: 63 TBILI: 0.2   ALB: 3.3 (L) TOT PROTEIN: 5.9 (L) LIPASE: N/A     Trop: 14 BNP: N/A     TSH: N/A T4: N/A A1C: 5.7 (H)     Procal: N/A CRP: N/A Lactic Acid: 1.5           Venous Blood Gas  Recent Labs   Lab 12/14/23  1233 12/14/23  0010   PHV 7.37 7.29*   PCO2V 43 52*   PO2V 67* 32   HCO3V 25 25   RA -0.6 -1.9   O2PER 21 40       Imaging results reviewed over the past 24 hrs:   Recent Results (from the past 24 hour(s))   XR Chest Port 1 View    Narrative    EXAM: XR CHEST PORT 1 VIEW  LOCATION: [facility]  DATE: 12/14/2023    INDICATION: Syncope, hypoxia  COMPARISON: None.      Impression    IMPRESSION: Heart size and pulmonary vascularity normal. Elevated right hemidiaphragm. Interstitial densities right base could represent subsegmental atelectasis or infiltrate. The left lung is clear. No effusions.   CT Head w/o Contrast    Narrative    EXAM: CT HEAD W/O CONTRAST  LOCATION: [facility]  DATE: 12/14/2023    INDICATION: Syncopal episode.  COMPARISON: None.  TECHNIQUE: Routine CT Head without IV contrast. Multiplanar reformats. Dose reduction techniques were used.    FINDINGS:  INTRACRANIAL CONTENTS: No intracranial hemorrhage, extraaxial collection, or mass effect. No CT evidence of acute infarct. Mild  presumed chronic small vessel ischemic changes. Mild generalized volume loss. No hydrocephalus. Chronic lacunar infarct in the   right caudate nucleus.     VISUALIZED ORBITS/SINUSES/MASTOIDS: No intraorbital abnormality. Air-fluid level in the left maxillary sinus. Small polyp or retention cyst in the right maxillary sinus. No middle ear or mastoid effusion.    BONES/SOFT TISSUES: No acute abnormality.      Impression    IMPRESSION:  1.  No definite acute intracranial pathology.  2.  Mild volume loss and presumed chronic small vessel ischemic changes.  3.  Chronic appearing lacunar infarct in the right caudate nucleus.  4.  Air-fluid level in the left maxillary sinus. Correlate for acute sinusitis.   Echocardiogram Complete   Result Value    LVEF  50-55%    Narrative    [id number]  [id number]  [id number]  [id number]^KALANI VALLECILLO^WELLINGTON^MIKAYLA     [facility]  Echocardiography Laboratory  [address] [place] [address]     Name: PRETTY TEE  MRN: [id number]  : 1955  Study Date: 2023 08:41 AM  Age: 68 yrs  Gender: Male  Patient Location: [facility]  Reason For Study: Syncope  History: ryan  Ordering Physician: WELLINGTON GUERRERO  Performed By: Gin Healy     BSA: 1.9 m2  Height: 69 in  Weight: 171 lb  HR: 75  BP: 130/84 mmHg  ______________________________________________________________________________  Procedure  Complete Portable Echo Adult.  ______________________________________________________________________________  Interpretation Summary     Left ventricular systolic function is low normal.  The visual ejection fraction is 50-55%.  Basal inferior wall is hypokinetic  The mitral regurgitant jet is eccentrically directed.  Evaluation of regurgitation is inadequate.  Technically difficult, suboptimal study. There is no comparison study  available.  ______________________________________________________________________________  Left Ventricle  The  left ventricle is normal in size. Left ventricular systolic function is  low normal. The visual ejection fraction is 50-55%. Diastolic Doppler findings  (E/E' ratio and/or other parameters) suggest left ventricular filling  pressures are indeterminate. Basal inferior wall is hypokinetic.     Right Ventricle  The right ventricle is not well visualized. The right ventricular systolic  function is normal.     Atria  The left atrium is not well visualized. The left atrium is mildly dilated.  Right atrial size is normal.     Mitral Valve  The mitral valve is not well visualized. The mitral regurgitant jet is  eccentrically directed. There is mild to moderate (1-2+) mitral regurgitation.  Evaluation of regurgitation is inadequate.     Tricuspid Valve  The tricuspid valve is not well visualized. Right ventricular systolic  pressure could not be approximated due to inadequate tricuspid regurgitation.     Aortic Valve  The aortic valve is not well visualized. The aortic valve is trileaflet. No  hemodynamically significant valvular aortic stenosis.     Pulmonic Valve  The pulmonic valve is not well visualized.     Vessels  The aortic Sinus(es) of Valsalva are mildly dilated. Ascending aorta  dilatation is present. Dilation of the inferior vena cava is present with  abnormal respiratory variation in diameter.     Pericardium  The pericardium is not well visualized.     ______________________________________________________________________________  MMode/2D Measurements & Calculations  IVSd: 1.6 cm  LVIDd: 5.3 cm  LVIDs: 4.8 cm  LVPWd: 1.3 cm  FS: 9.7 %     LV mass(C)d: 345.5 grams  LV mass(C)dI: 178.8 grams/m2  Ao root diam: 4.2 cm  LA dimension: 3.7 cm  asc Aorta Diam: 4.4 cm  LA/Ao: 0.88  Ao root diam index Ht(cm/m): 2.4  Ao root diam index BSA (cm/m2): 2.2  Asc Ao diam index BSA (cm/m2): 2.3  Asc Ao diam index Ht(cm/m): 2.5  RWT: 0.50  TAPSE: 2.1 cm     Doppler Measurements & Calculations  MV E max isamar: 83.1 cm/sec  MV A  max arthur: 99.4 cm/sec  MV E/A: 0.84  MV dec time: 0.29 sec  Ao V2 max: 122.8 cm/sec  Ao max P.0 mmHg  LV V1 max PG: 3.3 mmHg  LV V1 max: 90.8 cm/sec     PA V2 max: 90.7 cm/sec  PA max PG: 3.3 mmHg  PA acc time: 0.08 sec  TR max arthur: 242.2 cm/sec  TR max P.5 mmHg  AV Arthur Ratio (DI): 0.74  Medial E/e': 12.5  RV S Arthur: 10.7 cm/sec     ______________________________________________________________________________  Report approved by: Susan Beltran 2023 10:52 AM         US Renal Complete Non-Vascular    Narrative    ULTRASOUND RENAL COMPLETE NON-VASCULAR   2023 10:56 AM     HISTORY: Acute kidney injury.    COMPARISON: None.    FINDINGS:    Right Kidney: Measures 9.7 x 4.8 x 4.5 cm, cortex thickness of 1.2 cm.  No hydronephrosis or focal lesion.    Left Kidney: Measures 9.9 x 5.0 x 4.6 cm, cortex thickness of 1.5 cm.  No hydronephrosis or focal lesion.    Bladder: Unremarkable.       Impression    IMPRESSION: No hydronephrosis. No specific acute abnormality.    DAVID MARINELLI MD         SYSTEM ID:  [id number]   US Carotid Bilateral    Narrative    US CAROTID BILATERAL 2023 10:59 AM    HISTORY: Carotid stenosis. Vascular disease.    COMPARISON: None    FINDINGS:     Right side:   On the grayscale images, there is mild calcified plaque in the  proximal internal carotid artery.  Spectral waveform analysis was performed. Peak systolic and diastolic  velocities in the right internal carotid artery are 126 and 42 cm/s.  Per sonographic criteria, degree of stenosis in the right internal  carotid artery is less than 50%.  Flow in the right vertebral artery is antegrade.     Left side:   On the grayscale images, there is mild calcified plaque in the  proximal internal carotid artery.  Spectral waveform analysis was performed. Peak systolic and diastolic   velocities in the left internal carotid artery are 130 and 54 cm/s.  Per sonographic criteria, degree of stenosis in the left internal  carotid  artery is 50-69%.  Flow in the left vertebral artery is antegrade.       Impression    IMPRESSION: Per sonographic criteria, there is a less than 50%  stenosis in the right internal carotid artery and a 50-69% stenosis in  the left internal carotid artery.    Degree of stenosis measured relative to the diameter of the normal  internal carotid artery per NASCET or NASCET type criteria    DEBORAH OLIVA MD         SYSTEM ID:  [id number]     Recent Labs   Lab 12/14/23  1645 12/14/23  1233 12/14/23  1146 12/14/23  0010   WBC  --   --   --  8.2   HGB  --  8.4*  --  8.7*   MCV  --   --   --  86   PLT  --   --   --  227   NA  --  142  --  140   POTASSIUM  --  4.0  --  3.5   CHLORIDE  --  111*  --  107   CO2  --  23 --  23   BUN  --  28.5*  --  33.4*   CR  --  1.58*  --  2.49*   ANIONGAP  --  8  --  10   SEVERO  --  8.0*  --  7.7*   GLC 98 100* 104* 112*   ALBUMIN  --   --   --  3.3*   PROTTOTAL  --   --   --  5.9*   BILITOTAL  --   --   --  0.2   ALKPHOS  --   --   --  63   ALT  --   --   --  10   AST  --   --   --  13

## 2023-12-14 NOTE — PLAN OF CARE
Physical therapy, Occupational therapy and Speech Language Pathology: Orders received for stroke work up. Chart reviewed and discussed with care team.? Physical therapy, Occupational therapy and Speech Language Pathology: not indicated due to patient without documentation of stroke, negative CT and MD documentation with no mention of stroke work up or concerns. Per morning rounds patient up SBA, no concerns of swallowing (per daughter and nurse).? Defer discharge recommendations to medical team.? Will complete orders.     Thank you for your referral.  Nicole Alba, PT, DPT, ATC, [facility]  O: [phone]  E: [email or web address]

## 2023-12-14 NOTE — ED NOTES
Pt arrived via EMS. His daughter states that he is visiting here from [place] and was at his other daughter's house when he had a syncopal episode. Per EMS pt had just taken a Delta 8 gummy. Upon arrival to the unit pt was on 15 L non rebreather. Alert to self. Pt was given 2 ml Narcan via 18 g left forearm. Transferred to 5L mask at 94%. 1000ml NS pressured bag and a second followed.

## 2023-12-15 VITALS
TEMPERATURE: 99.3 F | SYSTOLIC BLOOD PRESSURE: 166 MMHG | RESPIRATION RATE: 20 BRPM | WEIGHT: 171.8 LBS | HEIGHT: 69 IN | OXYGEN SATURATION: 97 % | HEART RATE: 79 BPM | DIASTOLIC BLOOD PRESSURE: 98 MMHG | BODY MASS INDEX: 25.45 KG/M2

## 2023-12-15 LAB
ANION GAP SERPL CALCULATED.3IONS-SCNC: 11 MMOL/L (ref 7–15)
BUN SERPL-MCNC: 20.1 MG/DL (ref 8–23)
CA-I BLD-MCNC: 4.6 MG/DL (ref 4.4–5.2)
CALCIUM SERPL-MCNC: 8 MG/DL (ref 8.8–10.2)
CHLORIDE SERPL-SCNC: 109 MMOL/L (ref 98–107)
CREAT SERPL-MCNC: 1.27 MG/DL (ref 0.67–1.17)
DEPRECATED HCO3 PLAS-SCNC: 20 MMOL/L (ref 22–29)
EGFRCR SERPLBLD CKD-EPI 2021: 62 ML/MIN/1.73M2
GLUCOSE SERPL-MCNC: 90 MG/DL (ref 70–99)
HGB BLD-MCNC: 8.9 G/DL (ref 13.3–17.7)
MAGNESIUM SERPL-MCNC: 1.9 MG/DL (ref 1.7–2.3)
POTASSIUM SERPL-SCNC: 3.9 MMOL/L (ref 3.4–5.3)
SODIUM SERPL-SCNC: 140 MMOL/L (ref 135–145)

## 2023-12-15 PROCEDURE — 82330 ASSAY OF CALCIUM: CPT | Performed by: PEDIATRICS

## 2023-12-15 PROCEDURE — 250N000013 HC RX MED GY IP 250 OP 250 PS 637: Performed by: HOSPITALIST

## 2023-12-15 PROCEDURE — 90662 IIV NO PRSV INCREASED AG IM: CPT | Performed by: HOSPITALIST

## 2023-12-15 PROCEDURE — G0378 HOSPITAL OBSERVATION PER HR: HCPCS

## 2023-12-15 PROCEDURE — 99238 HOSP IP/OBS DSCHRG MGMT 30/<: CPT | Performed by: PEDIATRICS

## 2023-12-15 PROCEDURE — G0008 ADMIN INFLUENZA VIRUS VAC: HCPCS | Performed by: HOSPITALIST

## 2023-12-15 PROCEDURE — 85018 HEMOGLOBIN: CPT | Performed by: PEDIATRICS

## 2023-12-15 PROCEDURE — 83735 ASSAY OF MAGNESIUM: CPT | Performed by: HOSPITALIST

## 2023-12-15 PROCEDURE — 96372 THER/PROPH/DIAG INJ SC/IM: CPT | Performed by: HOSPITALIST

## 2023-12-15 PROCEDURE — 250N000011 HC RX IP 250 OP 636: Mod: JZ | Performed by: HOSPITALIST

## 2023-12-15 PROCEDURE — 250N000013 HC RX MED GY IP 250 OP 250 PS 637: Performed by: PEDIATRICS

## 2023-12-15 PROCEDURE — 80048 BASIC METABOLIC PNL TOTAL CA: CPT | Performed by: PEDIATRICS

## 2023-12-15 PROCEDURE — 36415 COLL VENOUS BLD VENIPUNCTURE: CPT | Performed by: PEDIATRICS

## 2023-12-15 RX ORDER — GABAPENTIN 300 MG/1
600 CAPSULE ORAL 2 TIMES DAILY
Status: DISCONTINUED | OUTPATIENT
Start: 2023-12-15 | End: 2023-12-15 | Stop reason: HOSPADM

## 2023-12-15 RX ORDER — LISINOPRIL 10 MG/1
20 TABLET ORAL DAILY
Status: DISCONTINUED | OUTPATIENT
Start: 2023-12-15 | End: 2023-12-15 | Stop reason: HOSPADM

## 2023-12-15 RX ORDER — ACETAMINOPHEN 325 MG/1
975 TABLET ORAL EVERY 6 HOURS PRN
COMMUNITY
Start: 2023-12-15

## 2023-12-15 RX ORDER — FERROUS SULFATE 325(65) MG
325 TABLET ORAL
Qty: 30 TABLET | Refills: 0 | Status: SHIPPED | OUTPATIENT
Start: 2023-12-15

## 2023-12-15 RX ORDER — PANTOPRAZOLE SODIUM 40 MG/1
40 TABLET, DELAYED RELEASE ORAL 2 TIMES DAILY
Status: DISCONTINUED | OUTPATIENT
Start: 2023-12-15 | End: 2023-12-15 | Stop reason: HOSPADM

## 2023-12-15 RX ADMIN — LISINOPRIL 20 MG: 10 TABLET ORAL at 09:14

## 2023-12-15 RX ADMIN — ENOXAPARIN SODIUM 40 MG: 40 INJECTION SUBCUTANEOUS at 09:14

## 2023-12-15 RX ADMIN — GABAPENTIN 600 MG: 300 CAPSULE ORAL at 09:14

## 2023-12-15 RX ADMIN — INFLUENZA A VIRUS A/VICTORIA/4897/2022 IVR-238 (H1N1) ANTIGEN (FORMALDEHYDE INACTIVATED), INFLUENZA A VIRUS A/DARWIN/9/2021 SAN-010 (H3N2) ANTIGEN (FORMALDEHYDE INACTIVATED), INFLUENZA B VIRUS B/PHUKET/3073/2013 ANTIGEN (FORMALDEHYDE INACTIVATED), AND INFLUENZA B VIRUS B/MICHIGAN/01/2021 ANTIGEN (FORMALDEHYDE INACTIVATED) 0.7 ML: 60; 60; 60; 60 INJECTION, SUSPENSION INTRAMUSCULAR at 09:19

## 2023-12-15 RX ADMIN — FAMOTIDINE 10 MG: 10 TABLET ORAL at 09:14

## 2023-12-15 RX ADMIN — PANTOPRAZOLE SODIUM 40 MG: 40 TABLET, DELAYED RELEASE ORAL at 09:14

## 2023-12-15 RX ADMIN — FERROUS SULFATE TAB 325 MG (65 MG ELEMENTAL FE) 325 MG: 325 (65 FE) TAB at 09:14

## 2023-12-15 ASSESSMENT — ACTIVITIES OF DAILY LIVING (ADL)
ADLS_ACUITY_SCORE: 22

## 2023-12-15 NOTE — PROGRESS NOTES
PRIMARY DIAGNOSIS: SYNCOPE  OUTPATIENT/OBSERVATION GOALS TO BE MET BEFORE DISCHARGE:  1. Orthostatic performed: Yes:          Lying Orthostatic BP: 145/81 ()         Sitting Orthostatic BP: 141/88 ()         Standing Orthostatic BP: 144/81 ()     2. Diagnostic testing complete & at baseline neurologic testing: Yes.       O2 92% on RA.    3. Cleared by consultants (if involved): N/A    4. Interpretation of cardiac rhythm per telemetry tech: NSR per RN    5. Tolerating adequate PO diet and medications: Yes    6. Return to near baseline physical activity or neurologic status: Yes    Discharge Planner Nurse   Safe discharge environment identified: Yes  Barriers to discharge: No       Entered by: Myesha Phipps RN 12/14/2023 10:34 PM     Please review provider order for any additional goals.   Nurse to notify provider when observation goals have been met and patient is ready for discharge.

## 2023-12-15 NOTE — PROGRESS NOTES
PRIMARY DIAGNOSIS: SYNCOPE  OUTPATIENT/OBSERVATION GOALS TO BE MET BEFORE DISCHARGE:  1. Orthostatic performed: Yes, VSS, no lightheadedness          2. Diagnostic testing complete & at baseline neurologic testing: Yes    3. Cleared by consultants (if involved): NA    4. Interpretation of cardiac rhythm: Yes, sinus rhythm. No further episodes of syncope and no new arrhythmias.     5. Tolerating adequate PO diet and medications: Yes    6. Return to near baseline physical activity or neurologic status: Yes    Discharge Planner Nurse   Safe discharge environment identified: Yes  Barriers to discharge: No       Entered by: Martha Sanchez RN 12/15/2023 10:45 AM

## 2023-12-15 NOTE — DISCHARGE SUMMARY
"[facility]  Hospitalist Discharge Summary      Date of Admission:  12/13/2023  Date of Discharge:  12/15/2023  Discharging Provider: Gopal Landrum MD  Discharge Service: Hospitalist Service    Discharge Diagnoses   Principal Problem:    Syncope, unspecified syncope type  Active Problems:    Hypotension, unspecified hypotension type    Acute kidney injury (H24)    Hypoxia    Iron deficiency anemia    HTN (hypertension)    Inadequate oral intake    COPD (chronic obstructive pulmonary disease) (H)    Gastroesophageal reflux disease without esophagitis    Clinically Significant Risk Factors     # Overweight: Estimated body mass index is 25.37 kg/m  as calculated from the following:    Height as of this encounter: 1.753 m (5' 9\").    Weight as of this encounter: 77.9 kg (171 lb 12.8 oz).       Follow-ups Needed After Discharge   Follow-up Appointments     Follow-up and recommended labs and tests       Follow up with primary care provider for hospital follow- up.  The   following labs/tests are recommended: hemoglobin, reticulocyte count and   basic metabolic panel.            Discharge Disposition   Discharged to home  Condition at discharge: Stable    Hospital Course   68-year-old man from [place] with chronic hypertension, COPD, chronic kidney disease with baseline creatinine 1.3, iron deficiency, and aortic aneurysm who drove to [place] on December 11 presented to ER after witnessed syncopal event at his daughter's home.  Due to concerns for syncope, acute kidney injury, and chronic cannabis use, he was hospitalized.    Syncope  Hypotension  Suspect syncope due to hypotension of multifactorial causes including chronic use of lisinopril, hypovolemia associated with poor oral intake recently, and chronic anemia.  Transient hypotension at presentation resolved after IV fluid bolus and holding previous chronic dose of lisinopril.  He did not have any recurrent syncopal events.  Acute " stroke was not suspected clinically or based on radiographic findings.  Acute ischemic heart disease was not suspected clinically or based on test results.  Seizure was not suspected.  After treatment with IV fluids and initially holding lisinopril, blood pressure progressively increased, so chronic dose of lisinopril was restarted.  He tolerated advancing diet and activity without difficulty.  He was advised to be sure to consume adequate oral intake.    Acute kidney injury  Chronic kidney disease  Limited medical records from [place] indicate the patient carries diagnosis of chronic kidney disease of unknown stage with baseline creatinine 1.3 in May 2023.  He presented with creatinine 2.49, significantly increased compared with previous baseline suspicious for acute kidney injury.  Renal function recovered to baseline by discharge after treatment including IV fluids.  Suspect acute kidney injury was precipitated by hypovolemia and hypotension.  It was possible that chronic use of ibuprofen contributed.  He was advised to avoid use of ibuprofen and to use alternate analgesics to manage his chronic pain.  Close outpatient follow-up with his PCP in [place] to discuss alternative chronic pain management strategies was recommended.    Hypoxia  COPD  Atelectasis  Has intermittently been severely hypoxic treated intermittently and transiently with low-flow oxygen supplementation.  As he has become more interactive, hypoxia has completely resolved.  He also presented with initial mild respiratory acidosis that has also resolved as he has become more responsive.  Prior medical records refer to diagnosis of COPD for which he has previously been prescribed tiotropium and/or albuterol.  Clinical course was not suspicious for COPD exacerbation.  Elevated right hemidiaphragm with densities at the right lung base were present radiographically most likely due to atelectasis after his syncopal event based on clinical course.   Infiltrate and pneumonia were not suspected and he was not treated with antibiotics.  Although there was initial concern for possible acute respiratory failure, respiratory failure was ruled out after additional investigation.  Suspect hypoxia started after syncopal event rather than triggering syncopal event.    Iron deficiency anemia  Moderately anemic at admission with hemoglobin 8-9 that was stable during hospitalization.  Active bleeding was not suspected nor was recent bleeding reported.  In June 2023 test results performed in [place] were suspicious for iron deficiency, so suspect he had chronic iron deficiency anemia.  Chronic anemia likely contributed to risk for hypovolemia and syncope.  Treatment with iron supplementation was recommended with which he was agreeable and prescription was provided at discharge.  Outpatient follow-up with PCP in [place] to recheck hemoglobin and reticulocyte count was recommended.    Poor oral intake  Hypoalbuminemia  He reported very little oral intake on December 11 and December 12.  He was driving all day on December 11 to [place] from [place].  On December 12 he slept all day.  He reported he had not had much appetite lately and did not have much oral intake on December 13 either.  Suspect poor oral intake contributed to hypovolemia and syncope.  Serum albumin was low at 3.3.  Suspect hypoalbuminemia was due to poor oral nutritional intake.  Oral intake improved and was good by discharge.    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  He was not known to have CAD.  He had not had angina.  Troponin was normal.  There were no ischemic changes on EKG.  LVEF was normal.  Outpatient follow-up with his PCP in [place] regarding suspected old MI was recommended.    Probable old lacunar stroke  Hyperlipidemia  Head CT demonstrated findings  suspicious for old lacunar infarct raising suspicion for previous stroke although there was no clinical history of stroke.  He did not appear to be taking medication that would normally be recommended for secondary prevention of stroke such as antiplatelet therapy or statin.  Lipid panel was abnormal notable for low HDL and elevated LDL and non-HDL.  Outpatient follow-up with his PCP in [place] to discuss secondary prevention of stroke with antiplatelet therapy and/or statin was recommended in the context of suspected old lacunar infarct.    GERD  Chronic GERD was clinically stable while continuing chronic PPI.    Consultations This Hospital Stay   NEUROLOGY IP STROKE CONSULT  SPEECH LANGUAGE PATH ADULT IP CONSULT  PHARMACY IP CONSULT  PHARMACY IP CONSULT  PHARMACY IP CONSULT  PHYSICAL THERAPY ADULT IP CONSULT  OCCUPATIONAL THERAPY ADULT IP CONSULT  REHAB ADMISSIONS LIAISON IP CONSULT  CARE MANAGEMENT / SOCIAL WORK IP CONSULT    Code Status   Special Code    Time Spent on this Encounter   I, Gopal Landrum MD, personally saw the patient today and spent less than or equal to 30 minutes discharging this patient.       Gopal Landrum MD  [address] SURGICAL  [address] [place] [address]  Phone: [phone]  ______________________________________________________________________    Physical Exam   Vital Signs: Temp: 99.3  F (37.4  C) Temp src: Oral BP: (!) 166/98 Pulse: 79   Resp: 20 SpO2: 97 % O2 Device: None (Room air)    Weight: 171 lbs 12.8 oz  General Appearance: Animated elderly man, no acute distress sitting up in a chair  Respiratory: Normal respiratory effort  Cardiovascular: Regular rate and rhythm  Neuro: Alert and normally conversive, no obvious focal deficits       Primary Care Physician   Physician No Ref-Primary    Discharge Orders      Reason for your hospital stay    Hospitalized after a fainting spell (syncope) and improved. Test results were concerning for  old stroke (old lacunar infarct on head CT) and old heart attack (inferior wall akinesis on Echo), so follow up with PCP to review other treatment options was advised.     Follow-up and recommended labs and tests     Follow up with primary care provider for hospital follow- up.  The following labs/tests are recommended: hemoglobin, reticulocyte count and basic metabolic panel.     Activity    Your activity upon discharge: activity as tolerated     Diet    Follow this diet upon discharge: Orders Placed This Encounter      Regular diet       Significant Results and Procedures   Most Recent 3 CBC's:  Recent Labs   Lab Test 12/15/23  0614 12/14/23  1233 12/14/23  0010   WBC  --   --  8.2   HGB 8.9* 8.4* 8.7*   MCV  --   --  86   PLT  --   --  227     Most Recent 3 BMP's:  Recent Labs   Lab Test 12/15/23  0614 12/14/23  2039 12/14/23  1645 12/14/23  1233 12/14/23  1146 12/14/23  0010     --   --  142  --  140   POTASSIUM 3.9  --   --  4.0  --  3.5   CHLORIDE 109*  --   --  111*  --  107   CO2 20*  --   --  23  --  23   BUN 20.1  --   --  28.5*  --  33.4*   CR 1.27*  --   --  1.58*  --  2.49*   ANIONGAP 11  --   --  8  --  10   SEVERO 8.0*  --   --  8.0*  --  7.7*   GLC 90 105* 98 100*   < > 112*    < > = values in this interval not displayed.     Most Recent 2 LFT's:  Recent Labs   Lab Test 12/14/23  0010   AST 13   ALT 10   ALKPHOS 63   BILITOTAL 0.2     Most Recent Hemoglobin A1c:  Recent Labs   Lab Test 12/14/23  0010   A1C 5.7*     Most Recent 6 glucoses:  Recent Labs   Lab Test 12/15/23  0614 12/14/23  2039 12/14/23  1645 12/14/23  1233 12/14/23  1146 12/14/23  0010   GLC 90 105* 98 100* 104* 112*   ,   Results for orders placed or performed during the hospital encounter of 12/13/23   XR Chest Port 1 View    Narrative    EXAM: XR CHEST PORT 1 VIEW  LOCATION: [facility]  DATE: 12/14/2023    INDICATION: Syncope, hypoxia  COMPARISON: None.      Impression    IMPRESSION: Heart size and  pulmonary vascularity normal. Elevated right hemidiaphragm. Interstitial densities right base could represent subsegmental atelectasis or infiltrate. The left lung is clear. No effusions.   CT Head w/o Contrast    Narrative    EXAM: CT HEAD W/O CONTRAST  LOCATION: [facility]  DATE: 12/14/2023    INDICATION: Syncopal episode.  COMPARISON: None.  TECHNIQUE: Routine CT Head without IV contrast. Multiplanar reformats. Dose reduction techniques were used.    FINDINGS:  INTRACRANIAL CONTENTS: No intracranial hemorrhage, extraaxial collection, or mass effect. No CT evidence of acute infarct. Mild presumed chronic small vessel ischemic changes. Mild generalized volume loss. No hydrocephalus. Chronic lacunar infarct in the   right caudate nucleus.     VISUALIZED ORBITS/SINUSES/MASTOIDS: No intraorbital abnormality. Air-fluid level in the left maxillary sinus. Small polyp or retention cyst in the right maxillary sinus. No middle ear or mastoid effusion.    BONES/SOFT TISSUES: No acute abnormality.      Impression    IMPRESSION:  1.  No definite acute intracranial pathology.  2.  Mild volume loss and presumed chronic small vessel ischemic changes.  3.  Chronic appearing lacunar infarct in the right caudate nucleus.  4.  Air-fluid level in the left maxillary sinus. Correlate for acute sinusitis.   US Carotid Bilateral    Narrative    US CAROTID BILATERAL 12/14/2023 10:59 AM    HISTORY: Carotid stenosis. Vascular disease.    COMPARISON: None    FINDINGS:     Right side:   On the grayscale images, there is mild calcified plaque in the  proximal internal carotid artery.  Spectral waveform analysis was performed. Peak systolic and diastolic  velocities in the right internal carotid artery are 126 and 42 cm/s.  Per sonographic criteria, degree of stenosis in the right internal  carotid artery is less than 50%.  Flow in the right vertebral artery is antegrade.     Left side:   On the grayscale images,  there is mild calcified plaque in the  proximal internal carotid artery.  Spectral waveform analysis was performed. Peak systolic and diastolic   velocities in the left internal carotid artery are 130 and 54 cm/s.  Per sonographic criteria, degree of stenosis in the left internal  carotid artery is 50-69%.  Flow in the left vertebral artery is antegrade.       Impression    IMPRESSION: Per sonographic criteria, there is a less than 50%  stenosis in the right internal carotid artery and a 50-69% stenosis in  the left internal carotid artery.    Degree of stenosis measured relative to the diameter of the normal  internal carotid artery per NASCET or NASCET type criteria    DEBORAH OLIVA MD         SYSTEM ID:  [id number]   US Renal Complete Non-Vascular    Narrative    ULTRASOUND RENAL COMPLETE NON-VASCULAR   2023 10:56 AM     HISTORY: Acute kidney injury.    COMPARISON: None.    FINDINGS:    Right Kidney: Measures 9.7 x 4.8 x 4.5 cm, cortex thickness of 1.2 cm.  No hydronephrosis or focal lesion.    Left Kidney: Measures 9.9 x 5.0 x 4.6 cm, cortex thickness of 1.5 cm.  No hydronephrosis or focal lesion.    Bladder: Unremarkable.       Impression    IMPRESSION: No hydronephrosis. No specific acute abnormality.    DAVID MARINELLI MD         SYSTEM ID:  [id number]   Echocardiogram Complete     Value    LVEF  50-55%    Narrative    [id number]  [id number]  [id number]  [id number]^KALANI VALLECILLO^WELLINGTON^MIKAYLA     [facility]  Echocardiography Laboratory  [address] [place] [address]     Name: PRETTY TEE  MRN: [id number]  : 1955  Study Date: 2023 08:41 AM  Age: 68 yrs  Gender: Male  Patient Location: [facility]  Reason For Study: Syncope  History: ryan  Ordering Physician: WELLINGTON GUERRERO  Performed By: Gin Healy     BSA: 1.9 m2  Height: 69 in  Weight: 171 lb  HR: 75  BP: 130/84  mmHg  ______________________________________________________________________________  Procedure  Complete Portable Echo Adult.  ______________________________________________________________________________  Interpretation Summary     Left ventricular systolic function is low normal.  The visual ejection fraction is 50-55%.  Basal inferior wall is hypokinetic  The mitral regurgitant jet is eccentrically directed.  Evaluation of regurgitation is inadequate.  Technically difficult, suboptimal study. There is no comparison study  available.  ______________________________________________________________________________  Left Ventricle  The left ventricle is normal in size. Left ventricular systolic function is  low normal. The visual ejection fraction is 50-55%. Diastolic Doppler findings  (E/E' ratio and/or other parameters) suggest left ventricular filling  pressures are indeterminate. Basal inferior wall is hypokinetic.     Right Ventricle  The right ventricle is not well visualized. The right ventricular systolic  function is normal.     Atria  The left atrium is not well visualized. The left atrium is mildly dilated.  Right atrial size is normal.     Mitral Valve  The mitral valve is not well visualized. The mitral regurgitant jet is  eccentrically directed. There is mild to moderate (1-2+) mitral regurgitation.  Evaluation of regurgitation is inadequate.     Tricuspid Valve  The tricuspid valve is not well visualized. Right ventricular systolic  pressure could not be approximated due to inadequate tricuspid regurgitation.     Aortic Valve  The aortic valve is not well visualized. The aortic valve is trileaflet. No  hemodynamically significant valvular aortic stenosis.     Pulmonic Valve  The pulmonic valve is not well visualized.     Vessels  The aortic Sinus(es) of Valsalva are mildly dilated. Ascending aorta  dilatation is present. Dilation of the inferior vena cava is present with  abnormal respiratory  variation in diameter.     Pericardium  The pericardium is not well visualized.     ______________________________________________________________________________  MMode/2D Measurements & Calculations  IVSd: 1.6 cm  LVIDd: 5.3 cm  LVIDs: 4.8 cm  LVPWd: 1.3 cm  FS: 9.7 %     LV mass(C)d: 345.5 grams  LV mass(C)dI: 178.8 grams/m2  Ao root diam: 4.2 cm  LA dimension: 3.7 cm  asc Aorta Diam: 4.4 cm  LA/Ao: 0.88  Ao root diam index Ht(cm/m): 2.4  Ao root diam index BSA (cm/m2): 2.2  Asc Ao diam index BSA (cm/m2): 2.3  Asc Ao diam index Ht(cm/m): 2.5  RWT: 0.50  TAPSE: 2.1 cm     Doppler Measurements & Calculations  MV E max arthur: 83.1 cm/sec  MV A max arthur: 99.4 cm/sec  MV E/A: 0.84  MV dec time: 0.29 sec  Ao V2 max: 122.8 cm/sec  Ao max P.0 mmHg  LV V1 max PG: 3.3 mmHg  LV V1 max: 90.8 cm/sec     PA V2 max: 90.7 cm/sec  PA max PG: 3.3 mmHg  PA acc time: 0.08 sec  TR max arthur: 242.2 cm/sec  TR max P.5 mmHg  AV Arthur Ratio (DI): 0.74  Medial E/e': 12.5  RV S Arthur: 10.7 cm/sec     ______________________________________________________________________________  Report approved by: Susan Beltran 2023 10:52 AM             Discharge Medications   Current Discharge Medication List        START taking these medications    Details   acetaminophen (TYLENOL) 325 MG tablet Take 3 tablets (975 mg) by mouth every 6 hours as needed for pain    Associated Diagnoses: Syncope, unspecified syncope type      ferrous sulfate (FEROSUL) 325 (65 Fe) MG tablet Take 1 tablet (325 mg) by mouth daily (with breakfast)  Qty: 30 tablet, Refills: 0    Associated Diagnoses: Iron deficiency anemia, unspecified iron deficiency anemia type           CONTINUE these medications which have NOT CHANGED    Details   gabapentin (NEURONTIN) 300 MG capsule Take 600 mg by mouth 2 times daily      IBUPROFEN 800 MG PO TABS Take 800 mg by mouth 2 times daily as needed      lisinopril (ZESTRIL) 20 MG tablet Take 20 mg by mouth daily      omeprazole  (PRILOSEC) 20 MG DR capsule Take 40 mg by mouth 2 times daily           Allergies   Allergies   Allergen Reactions    Cipro [Ciprofloxacin]

## 2023-12-15 NOTE — PROGRESS NOTES
"PRIMARY DIAGNOSIS: \"GENERIC\" NURSING  OUTPATIENT/OBSERVATION GOALS TO BE MET BEFORE DISCHARGE:  ADLs back to baseline: Yes    Activity and level of assistance: Up with standby assistance with GB and cane. Some dizziness at initial standing. Orthostatics negative.     Pain status: Pain free.    Return to near baseline physical activity:  Cane per baseline.      Discharge Planner Nurse   Safe discharge environment identified: Yes  Barriers to discharge: No       Entered by: Myesha Phipps RN 12/15/2023 4:38 AM     Please review provider order for any additional goals.   Nurse to notify provider when observation goals have been met and patient is ready for discharge.    "

## 2023-12-16 ENCOUNTER — NURSE TRIAGE (OUTPATIENT)
Dept: NURSING | Facility: CLINIC | Age: 68
End: 2023-12-16
Payer: COMMERCIAL

## 2023-12-16 ENCOUNTER — HOSPITAL ENCOUNTER (EMERGENCY)
Facility: CLINIC | Age: 68
Discharge: HOME | End: 2023-12-16
Attending: STUDENT IN AN ORGANIZED HEALTH CARE EDUCATION/TRAINING PROGRAM | Admitting: STUDENT IN AN ORGANIZED HEALTH CARE EDUCATION/TRAINING PROGRAM
Payer: COMMERCIAL

## 2023-12-16 ENCOUNTER — APPOINTMENT (OUTPATIENT)
Dept: GENERAL RADIOLOGY | Facility: CLINIC | Age: 68
End: 2023-12-16
Attending: STUDENT IN AN ORGANIZED HEALTH CARE EDUCATION/TRAINING PROGRAM
Payer: COMMERCIAL

## 2023-12-16 VITALS
HEART RATE: 78 BPM | RESPIRATION RATE: 18 BRPM | DIASTOLIC BLOOD PRESSURE: 99 MMHG | TEMPERATURE: 98.4 F | OXYGEN SATURATION: 95 % | SYSTOLIC BLOOD PRESSURE: 146 MMHG

## 2023-12-16 DIAGNOSIS — U07.1 COVID-19 VIRUS INFECTION: ICD-10-CM

## 2023-12-16 DIAGNOSIS — N17.9 ACUTE KIDNEY INJURY (H): ICD-10-CM

## 2023-12-16 DIAGNOSIS — I10 HYPERTENSION, UNSPECIFIED TYPE: ICD-10-CM

## 2023-12-16 DIAGNOSIS — J44.9 CHRONIC OBSTRUCTIVE PULMONARY DISEASE, UNSPECIFIED COPD TYPE (H): ICD-10-CM

## 2023-12-16 DIAGNOSIS — D50.9 IRON DEFICIENCY ANEMIA, UNSPECIFIED IRON DEFICIENCY ANEMIA TYPE: ICD-10-CM

## 2023-12-16 LAB
ALBUMIN SERPL BCG-MCNC: 3.6 G/DL (ref 3.5–5.2)
ALP SERPL-CCNC: 70 U/L (ref 40–150)
ALT SERPL W P-5'-P-CCNC: 15 U/L (ref 0–70)
ANION GAP SERPL CALCULATED.3IONS-SCNC: 11 MMOL/L (ref 7–15)
AST SERPL W P-5'-P-CCNC: 15 U/L (ref 0–45)
BASOPHILS # BLD AUTO: 0 10E3/UL (ref 0–0.2)
BASOPHILS NFR BLD AUTO: 0 %
BILIRUB SERPL-MCNC: 0.3 MG/DL
BUN SERPL-MCNC: 14.9 MG/DL (ref 8–23)
CALCIUM SERPL-MCNC: 8.6 MG/DL (ref 8.8–10.2)
CHLORIDE SERPL-SCNC: 105 MMOL/L (ref 98–107)
CREAT SERPL-MCNC: 1.23 MG/DL (ref 0.67–1.17)
DEPRECATED HCO3 PLAS-SCNC: 23 MMOL/L (ref 22–29)
EGFRCR SERPLBLD CKD-EPI 2021: 64 ML/MIN/1.73M2
EOSINOPHIL # BLD AUTO: 0.1 10E3/UL (ref 0–0.7)
EOSINOPHIL NFR BLD AUTO: 2 %
ERYTHROCYTE [DISTWIDTH] IN BLOOD BY AUTOMATED COUNT: 15.6 % (ref 10–15)
FLUAV RNA SPEC QL NAA+PROBE: NEGATIVE
FLUBV RNA RESP QL NAA+PROBE: NEGATIVE
GLUCOSE SERPL-MCNC: 100 MG/DL (ref 70–99)
HCT VFR BLD AUTO: 27.5 % (ref 40–53)
HGB BLD-MCNC: 9.2 G/DL (ref 13.3–17.7)
IMM GRANULOCYTES # BLD: 0 10E3/UL
IMM GRANULOCYTES NFR BLD: 0 %
LYMPHOCYTES # BLD AUTO: 1.1 10E3/UL (ref 0.8–5.3)
LYMPHOCYTES NFR BLD AUTO: 21 %
MCH RBC QN AUTO: 27.9 PG (ref 26.5–33)
MCHC RBC AUTO-ENTMCNC: 33.5 G/DL (ref 31.5–36.5)
MCV RBC AUTO: 83 FL (ref 78–100)
MONOCYTES # BLD AUTO: 0.7 10E3/UL (ref 0–1.3)
MONOCYTES NFR BLD AUTO: 13 %
NEUTROPHILS # BLD AUTO: 3.5 10E3/UL (ref 1.6–8.3)
NEUTROPHILS NFR BLD AUTO: 64 %
NRBC # BLD AUTO: 0 10E3/UL
NRBC BLD AUTO-RTO: 0 /100
PLATELET # BLD AUTO: 197 10E3/UL (ref 150–450)
POTASSIUM SERPL-SCNC: 3.8 MMOL/L (ref 3.4–5.3)
PROT SERPL-MCNC: 6.8 G/DL (ref 6.4–8.3)
RBC # BLD AUTO: 3.3 10E6/UL (ref 4.4–5.9)
RSV RNA SPEC NAA+PROBE: NEGATIVE
SARS-COV-2 RNA RESP QL NAA+PROBE: POSITIVE
SODIUM SERPL-SCNC: 139 MMOL/L (ref 135–145)
WBC # BLD AUTO: 5.5 10E3/UL (ref 4–11)

## 2023-12-16 PROCEDURE — 71045 X-RAY EXAM CHEST 1 VIEW: CPT

## 2023-12-16 PROCEDURE — 99284 EMERGENCY DEPT VISIT MOD MDM: CPT | Mod: 25 | Performed by: STUDENT IN AN ORGANIZED HEALTH CARE EDUCATION/TRAINING PROGRAM

## 2023-12-16 PROCEDURE — 96360 HYDRATION IV INFUSION INIT: CPT | Performed by: STUDENT IN AN ORGANIZED HEALTH CARE EDUCATION/TRAINING PROGRAM

## 2023-12-16 PROCEDURE — 258N000003 HC RX IP 258 OP 636: Performed by: STUDENT IN AN ORGANIZED HEALTH CARE EDUCATION/TRAINING PROGRAM

## 2023-12-16 PROCEDURE — 85025 COMPLETE CBC W/AUTO DIFF WBC: CPT | Performed by: STUDENT IN AN ORGANIZED HEALTH CARE EDUCATION/TRAINING PROGRAM

## 2023-12-16 PROCEDURE — 80053 COMPREHEN METABOLIC PANEL: CPT | Performed by: STUDENT IN AN ORGANIZED HEALTH CARE EDUCATION/TRAINING PROGRAM

## 2023-12-16 PROCEDURE — 99284 EMERGENCY DEPT VISIT MOD MDM: CPT | Performed by: STUDENT IN AN ORGANIZED HEALTH CARE EDUCATION/TRAINING PROGRAM

## 2023-12-16 PROCEDURE — 87637 SARSCOV2&INF A&B&RSV AMP PRB: CPT | Performed by: STUDENT IN AN ORGANIZED HEALTH CARE EDUCATION/TRAINING PROGRAM

## 2023-12-16 PROCEDURE — 36415 COLL VENOUS BLD VENIPUNCTURE: CPT | Performed by: STUDENT IN AN ORGANIZED HEALTH CARE EDUCATION/TRAINING PROGRAM

## 2023-12-16 RX ORDER — ONDANSETRON 2 MG/ML
4 INJECTION INTRAMUSCULAR; INTRAVENOUS ONCE
Status: COMPLETED | OUTPATIENT
Start: 2023-12-16 | End: 2023-12-16

## 2023-12-16 RX ORDER — ONDANSETRON 4 MG/1
4 TABLET, ORALLY DISINTEGRATING ORAL EVERY 8 HOURS PRN
Qty: 10 TABLET | Refills: 0 | Status: SHIPPED | OUTPATIENT
Start: 2023-12-16 | End: 2023-12-19

## 2023-12-16 RX ADMIN — SODIUM CHLORIDE 500 ML: 9 INJECTION, SOLUTION INTRAVENOUS at 22:41

## 2023-12-16 ASSESSMENT — ACTIVITIES OF DAILY LIVING (ADL): ADLS_ACUITY_SCORE: 35

## 2023-12-17 NOTE — TELEPHONE ENCOUNTER
Verbal consent received from patient to speak with daughter Ximena.  Patient discharged from the hospital yesterday for syncopal episode.  Daughter states that patient has been lying in bed all day, hasn't taken any medications, no eating, no drinking.  Daughter also notes that patient is extremely diaphoretic. Patients temperature during triage is 98.8. Patient has urninated once today and appeared to be weak during ambulation per caller. Advised that patient be seen in ED.  Caller verbalized understanding of care advice.  Kera Campoverde RN on 12/16/2023 at 9:22 PM        Reason for Disposition   Patient sounds very sick or weak to the triager    Additional Information   Negative: Sounds like a life-threatening emergency to the triager    Protocols used: Post-Hospitalization Follow-up Call-A-[facility]

## 2023-12-17 NOTE — ED PROVIDER NOTES
History     Chief Complaint   Patient presents with    Flu Symptoms     HPI  Tanner Hilliard is a 68 year old male recently admitted on 12/13 through 12/15 for syncope with associated acute kidney injury hypoxia/COPD exacerbation, chronic anemia and poor oral intake. Baseline creatinine 1.3, anemia 8-9 and chronic cough.  He is also found to have an abnormal echo and previous possible lacunar infarcts.  Patient was discharged home and received the influenza vaccine prior to discharge home.  Daughter at bedside notes that he been otherwise sleeping for the majority of today since being discharged and has barely taking anything orally with minor fluids and no food.  He has not had any complaints of chest pain chills breathing shortness of breath cough abdominal pain nausea vomiting or diarrhea.  He has no rashes headaches dizziness or confusion.  He is mainly fatigued and tired.    Allergies:  Allergies   Allergen Reactions    Cipro [Ciprofloxacin]        Problem List:    Patient Active Problem List    Diagnosis Date Noted    Acute kidney injury (H24) 12/14/2023     Priority: Medium    Hypoxia 12/14/2023     Priority: Medium    Syncope, unspecified syncope type 12/14/2023     Priority: Medium    Iron deficiency anemia 12/14/2023     Priority: Medium    Gastroesophageal reflux disease without esophagitis 12/14/2023     Priority: Medium    HTN (hypertension) 12/14/2023     Priority: Medium    Hypotension, unspecified hypotension type 12/14/2023     Priority: Medium    Inadequate oral intake 12/14/2023     Priority: Medium    COPD (chronic obstructive pulmonary disease) (H) 12/14/2023     Priority: Medium    CARDIOVASCULAR SCREENING; LDL GOAL LESS THAN 130 10/31/2010     Priority: Medium    GERD (gastroesophageal reflux disease) 02/12/2009     Priority: Medium        Past Medical History:    Past Medical History:   Diagnosis Date    MEDICAL HISTORY OF - 1988 or 1989       Past Surgical History:    Past Surgical History:    Procedure Laterality Date    APPENDECTOMY  1996    VASECTOMY  1999       Family History:    Family History   Problem Relation Age of Onset    Heart Disease Father     Heart Disease Brother        Social History:  Marital Status:   [4]  Social History     Tobacco Use    Smoking status: Every Day     Packs/day: 1     Types: Cigarettes    Tobacco comments:     pt quit for 20 yrs started back in 2004   Substance Use Topics    Alcohol use: No    Drug use: Yes     Comment: quit in 2007        Medications:    nirmatrelvir and ritonavir (PAXLOVID) 150 mg/100 mg therapy pack  ondansetron (ZOFRAN ODT) 4 MG ODT tab  acetaminophen (TYLENOL) 325 MG tablet  ferrous sulfate (FEROSUL) 325 (65 Fe) MG tablet  gabapentin (NEURONTIN) 300 MG capsule  IBUPROFEN 800 MG PO TABS  lisinopril (ZESTRIL) 20 MG tablet  omeprazole (PRILOSEC) 20 MG DR capsule          Review of Systems    Physical Exam   BP: (!) 146/99  Pulse: 78  Temp: 98.4  F (36.9  C)  Resp: 18  SpO2: 95 %      Physical Exam    ED Course                 Procedures              Results for orders placed or performed during the hospital encounter of 12/16/23 (from the past 24 hour(s))   Symptomatic Influenza A/B, RSV, & SARS-CoV2 PCR (COVID-19) Nose    Specimen: Nose; Swab   Result Value Ref Range    Influenza A PCR Negative Negative    Influenza B PCR Negative Negative    RSV PCR Negative Negative    SARS CoV2 PCR Positive (A) Negative    Narrative    Testing was performed using the Xpert Xpress CoV2/Flu/RSV Assay on the [employer] GeneXpert Instrument. This test should be ordered for the detection of SARS-CoV-2, influenza, and RSV viruses in individuals who meet clinical and/or epidemiological criteria. Test performance is unknown in asymptomatic patients. This test is for in vitro diagnostic use under the FDA EUA for laboratories certified under CLIA to perform high or moderate complexity testing. This test has not been FDA cleared or approved. A negative result does  not rule out the presence of PCR inhibitors in the specimen or target RNA in concentration below the limit of detection for the assay. If only one viral target is positive but coinfection with multiple targets is suspected, the sample should be re-tested with another FDA cleared, approved, or authorized test, if coinfection would change clinical management. This test was validated by the [facility]. These laboratories are certified under the Clinical Laboratory Improvement Amendments of 1988 (CLIA-88) as qualified to perform high complexity laboratory testing.   CBC with platelets differential    Narrative    The following orders were created for panel order CBC with platelets differential.  Procedure                               Abnormality         Status                     ---------                               -----------         ------                     CBC with platelets and d...[[id number]]  Abnormal            Final result                 Please view results for these tests on the individual orders.   Comprehensive metabolic panel   Result Value Ref Range    Sodium 139 135 - 145 mmol/L    Potassium 3.8 3.4 - 5.3 mmol/L    Carbon Dioxide (CO2) 23 22 - 29 mmol/L    Anion Gap 11 7 - 15 mmol/L    Urea Nitrogen 14.9 8.0 - 23.0 mg/dL    Creatinine 1.23 (H) 0.67 - 1.17 mg/dL    GFR Estimate 64 >60 mL/min/1.73m2    Calcium 8.6 (L) 8.8 - 10.2 mg/dL    Chloride 105 98 - 107 mmol/L    Glucose 100 (H) 70 - 99 mg/dL    Alkaline Phosphatase 70 40 - 150 U/L    AST 15 0 - 45 U/L    ALT 15 0 - 70 U/L    Protein Total 6.8 6.4 - 8.3 g/dL    Albumin 3.6 3.5 - 5.2 g/dL    Bilirubin Total 0.3 <=1.2 mg/dL   CBC with platelets and differential   Result Value Ref Range    WBC Count 5.5 4.0 - 11.0 10e3/uL    RBC Count 3.30 (L) 4.40 - 5.90 10e6/uL    Hemoglobin 9.2 (L) 13.3 - 17.7 g/dL    Hematocrit 27.5 (L) 40.0 - 53.0 %    MCV 83 78 - 100 fL    MCH 27.9 26.5 - 33.0 pg    MCHC 33.5 31.5 - 36.5 g/dL    RDW 15.6  (H) 10.0 - 15.0 %    Platelet Count 197 150 - 450 10e3/uL    % Neutrophils 64 %    % Lymphocytes 21 %    % Monocytes 13 %    % Eosinophils 2 %    % Basophils 0 %    % Immature Granulocytes 0 %    NRBCs per 100 WBC 0 <1 /100    Absolute Neutrophils 3.5 1.6 - 8.3 10e3/uL    Absolute Lymphocytes 1.1 0.8 - 5.3 10e3/uL    Absolute Monocytes 0.7 0.0 - 1.3 10e3/uL    Absolute Eosinophils 0.1 0.0 - 0.7 10e3/uL    Absolute Basophils 0.0 0.0 - 0.2 10e3/uL    Absolute Immature Granulocytes 0.0 <=0.4 10e3/uL    Absolute NRBCs 0.0 10e3/uL   XR Chest Port 1 View    Narrative    EXAM: XR CHEST PORT 1 VIEW  LOCATION: [facility] [place]  DATE: 12/16/2023    INDICATION: cough  COMPARISON: 12/14/2023      Impression    IMPRESSION: Negative chest. Interval clearing of slight right perihilar infiltrate.       Medications   sodium chloride 0.9% BOLUS 500 mL (0 mLs Intravenous Stopped 12/16/23 1692)   ondansetron (ZOFRAN) injection 4 mg (4 mg Intravenous Not Given 12/16/23 5039)       Assessments & Plan (with Medical Decision Making)     I have reviewed the nursing notes.    I have reviewed the findings, diagnosis, plan and need for follow up with the patient.      Medical Decision Making  Pleasant 62 male presenting with generalized unwell feeling.  He feels that he has the flu.  Patient was admitted and hospitalized for 3 days secondary to acute kidney injury mild hypoxia however patient has no acute signs of hypoxemia here without any febrile illness or tachycardia.  He seems mildly unwell but denies any chest pain chills breathing shortness of breath cough abdominal pain nausea vomiting diarrhea or urinary complaints.  He just feels tired and does not have a good appetite and feels mild dehydrated.  Patient started on IV bolus of 500 mL of normal saline fluids CBC and CMP ordered showing an improving creatinine of 1.23 and GFR 64.  He continues to have a mild anemia of 9.2 but no acute changes with no  leukocytosis electrolyte disturbances however does have a positive COVID test likely contribute to patient's generalized unwell feeling and fatigue.  Patient provided with Paxlovid after discussion with pharmacy for appropriate dosing as patient was GFR was low yesterday will may maintain the half dose Paxlovid order sent to patient's pharmacy that can be started tomorrow.  Discussed hydration and maintaining appropriate hydration at home and appetite improvement if any nausea is present did send with Zofran prescription.  Return precautions discussed extensively in regards to chest pain shows breathing shortness of breath or worsening dehydration as he may require IV fluids.  Patient remained stable and in understands diagnosis as well as daughter at bedside and discharged home        New Prescriptions    NIRMATRELVIR AND RITONAVIR (PAXLOVID) 150 MG/100 MG THERAPY PACK    Take 2 tablets by mouth 2 times daily for 5 days    ONDANSETRON (ZOFRAN ODT) 4 MG ODT TAB    Take 1 tablet (4 mg) by mouth every 8 hours as needed for nausea       Final diagnoses:   COVID-19 virus infection   Hypertension, unspecified type   Iron deficiency anemia, unspecified iron deficiency anemia type   Chronic obstructive pulmonary disease, unspecified COPD type (H)   Acute kidney injury (H24)       12/16/2023   [facility] EMERGENCY DEPT       Denis Winslow MD  12/16/23 4110

## 2023-12-17 NOTE — ED TRIAGE NOTES
"Discharged on Fri 12/15 after 2 day admission. Pt was seen for a syncopal episode, states he is anemic and was told he had a \"small stroke and a small heart attack\". Fatigue, cough, decreased PO intake today.      Triage Assessment (Adult)       Row Name 12/16/23 2989          Triage Assessment    Airway WDL WDL        Respiratory WDL    Respiratory WDL cough        Skin Circulation/Temperature WDL    Skin Circulation/Temperature WDL WDL        Cardiac WDL    Cardiac WDL WDL        Peripheral/Neurovascular WDL    Peripheral Neurovascular WDL WDL        Cognitive/Neuro/Behavioral WDL    Cognitive/Neuro/Behavioral WDL WDL                     "

## 2023-12-17 NOTE — DISCHARGE INSTRUCTIONS
You are positive for COVID today.  We provided you with instructions to help care for yourself at home.  At this time your oxygen saturation is appropriate not tachypneic and not have any troubles breathing.  If any acute signs of hypoxia or shortness of breath please return for reevaluation otherwise you are provided with Zofran medications which helps with nausea and vomiting and because of times help with appetite.  We highly recommend fluid intake maintenance.  Dehydration can happen if not drinking and eating appropriately.

## 2025-02-13 NOTE — ED TRIAGE NOTES
Patient presents via EMS after taking a marijuana edible and collapsed a minute later. Patient went unresponsive. Per EMS patient was intitally 73% on RA - placed on 15 L which brought sats up to 98%. . 18 G placed by EMS.        
yes